# Patient Record
Sex: FEMALE | Race: WHITE | NOT HISPANIC OR LATINO
[De-identification: names, ages, dates, MRNs, and addresses within clinical notes are randomized per-mention and may not be internally consistent; named-entity substitution may affect disease eponyms.]

---

## 2018-05-01 ENCOUNTER — RESULT REVIEW (OUTPATIENT)
Age: 33
End: 2018-05-01

## 2018-08-06 ENCOUNTER — EMERGENCY (EMERGENCY)
Facility: HOSPITAL | Age: 33
LOS: 1 days | Discharge: HOME | End: 2018-08-06
Attending: EMERGENCY MEDICINE | Admitting: EMERGENCY MEDICINE
Payer: COMMERCIAL

## 2018-08-06 VITALS
DIASTOLIC BLOOD PRESSURE: 67 MMHG | HEART RATE: 94 BPM | RESPIRATION RATE: 17 BRPM | SYSTOLIC BLOOD PRESSURE: 117 MMHG | OXYGEN SATURATION: 100 % | TEMPERATURE: 99 F

## 2018-08-06 VITALS
OXYGEN SATURATION: 100 % | SYSTOLIC BLOOD PRESSURE: 128 MMHG | TEMPERATURE: 98 F | RESPIRATION RATE: 18 BRPM | DIASTOLIC BLOOD PRESSURE: 75 MMHG | HEIGHT: 69 IN | WEIGHT: 126.99 LBS | HEART RATE: 135 BPM

## 2018-08-06 DIAGNOSIS — R07.1 CHEST PAIN ON BREATHING: ICD-10-CM

## 2018-08-06 LAB
ALBUMIN SERPL ELPH-MCNC: 4.5 G/DL — SIGNIFICANT CHANGE UP (ref 3.3–5)
ALP SERPL-CCNC: 63 U/L — SIGNIFICANT CHANGE UP (ref 30–120)
ALT FLD-CCNC: 32 U/L DA — SIGNIFICANT CHANGE UP (ref 10–60)
ANION GAP SERPL CALC-SCNC: 15 MMOL/L — SIGNIFICANT CHANGE UP (ref 5–17)
APPEARANCE UR: CLEAR — SIGNIFICANT CHANGE UP
APTT BLD: 32.3 SEC — SIGNIFICANT CHANGE UP (ref 27.5–37.4)
AST SERPL-CCNC: 18 U/L — SIGNIFICANT CHANGE UP (ref 10–40)
BASOPHILS # BLD AUTO: 0.03 K/UL — SIGNIFICANT CHANGE UP (ref 0–0.2)
BASOPHILS NFR BLD AUTO: 0.6 % — SIGNIFICANT CHANGE UP (ref 0–2)
BILIRUB SERPL-MCNC: 0.3 MG/DL — SIGNIFICANT CHANGE UP (ref 0.2–1.2)
BILIRUB UR-MCNC: NEGATIVE — SIGNIFICANT CHANGE UP
BUN SERPL-MCNC: 14 MG/DL — SIGNIFICANT CHANGE UP (ref 7–23)
CALCIUM SERPL-MCNC: 9.9 MG/DL — SIGNIFICANT CHANGE UP (ref 8.4–10.5)
CHLORIDE SERPL-SCNC: 102 MMOL/L — SIGNIFICANT CHANGE UP (ref 96–108)
CK MB CFR SERPL CALC: 0.8 NG/ML — SIGNIFICANT CHANGE UP (ref 0–3.6)
CK SERPL-CCNC: 61 U/L — SIGNIFICANT CHANGE UP (ref 26–192)
CO2 SERPL-SCNC: 24 MMOL/L — SIGNIFICANT CHANGE UP (ref 22–31)
COLOR SPEC: YELLOW — SIGNIFICANT CHANGE UP
CREAT SERPL-MCNC: 0.8 MG/DL — SIGNIFICANT CHANGE UP (ref 0.5–1.3)
D DIMER BLD IA.RAPID-MCNC: 185 NG/ML DDU — SIGNIFICANT CHANGE UP
DIFF PNL FLD: NEGATIVE — SIGNIFICANT CHANGE UP
EOSINOPHIL # BLD AUTO: 0.16 K/UL — SIGNIFICANT CHANGE UP (ref 0–0.5)
EOSINOPHIL NFR BLD AUTO: 3 % — SIGNIFICANT CHANGE UP (ref 0–6)
GLUCOSE SERPL-MCNC: 116 MG/DL — HIGH (ref 70–99)
GLUCOSE UR QL: NEGATIVE MG/DL — SIGNIFICANT CHANGE UP
HCT VFR BLD CALC: 41 % — SIGNIFICANT CHANGE UP (ref 34.5–45)
HGB BLD-MCNC: 13.9 G/DL — SIGNIFICANT CHANGE UP (ref 11.5–15.5)
IMM GRANULOCYTES NFR BLD AUTO: 0.2 % — SIGNIFICANT CHANGE UP (ref 0–1.5)
INR BLD: 0.98 RATIO — SIGNIFICANT CHANGE UP (ref 0.88–1.16)
KETONES UR-MCNC: NEGATIVE — SIGNIFICANT CHANGE UP
LEUKOCYTE ESTERASE UR-ACNC: NEGATIVE — SIGNIFICANT CHANGE UP
LYMPHOCYTES # BLD AUTO: 1.95 K/UL — SIGNIFICANT CHANGE UP (ref 1–3.3)
LYMPHOCYTES # BLD AUTO: 36.9 % — SIGNIFICANT CHANGE UP (ref 13–44)
MCHC RBC-ENTMCNC: 31.3 PG — SIGNIFICANT CHANGE UP (ref 27–34)
MCHC RBC-ENTMCNC: 33.9 GM/DL — SIGNIFICANT CHANGE UP (ref 32–36)
MCV RBC AUTO: 92.3 FL — SIGNIFICANT CHANGE UP (ref 80–100)
MONOCYTES # BLD AUTO: 0.44 K/UL — SIGNIFICANT CHANGE UP (ref 0–0.9)
MONOCYTES NFR BLD AUTO: 8.3 % — SIGNIFICANT CHANGE UP (ref 2–14)
NEUTROPHILS # BLD AUTO: 2.69 K/UL — SIGNIFICANT CHANGE UP (ref 1.8–7.4)
NEUTROPHILS NFR BLD AUTO: 51 % — SIGNIFICANT CHANGE UP (ref 43–77)
NITRITE UR-MCNC: NEGATIVE — SIGNIFICANT CHANGE UP
PCP SPEC-MCNC: SIGNIFICANT CHANGE UP
PH UR: 5 — SIGNIFICANT CHANGE UP (ref 5–8)
PLATELET # BLD AUTO: 218 K/UL — SIGNIFICANT CHANGE UP (ref 150–400)
POTASSIUM SERPL-MCNC: 3.6 MMOL/L — SIGNIFICANT CHANGE UP (ref 3.5–5.3)
POTASSIUM SERPL-SCNC: 3.6 MMOL/L — SIGNIFICANT CHANGE UP (ref 3.5–5.3)
PROT SERPL-MCNC: 8.3 G/DL — SIGNIFICANT CHANGE UP (ref 6–8.3)
PROT UR-MCNC: NEGATIVE MG/DL — SIGNIFICANT CHANGE UP
PROTHROM AB SERPL-ACNC: 10.7 SEC — SIGNIFICANT CHANGE UP (ref 9.8–12.7)
RBC # BLD: 4.44 M/UL — SIGNIFICANT CHANGE UP (ref 3.8–5.2)
RBC # FLD: 13.1 % — SIGNIFICANT CHANGE UP (ref 10.3–14.5)
SODIUM SERPL-SCNC: 141 MMOL/L — SIGNIFICANT CHANGE UP (ref 135–145)
SP GR SPEC: 1.01 — SIGNIFICANT CHANGE UP (ref 1.01–1.02)
TROPONIN I SERPL-MCNC: 0 NG/ML — LOW (ref 0.02–0.06)
UROBILINOGEN FLD QL: NEGATIVE MG/DL — SIGNIFICANT CHANGE UP
WBC # BLD: 5.28 K/UL — SIGNIFICANT CHANGE UP (ref 3.8–10.5)
WBC # FLD AUTO: 5.28 K/UL — SIGNIFICANT CHANGE UP (ref 3.8–10.5)

## 2018-08-06 PROCEDURE — 93306 TTE W/DOPPLER COMPLETE: CPT

## 2018-08-06 PROCEDURE — 71260 CT THORAX DX C+: CPT | Mod: 26

## 2018-08-06 PROCEDURE — 85379 FIBRIN DEGRADATION QUANT: CPT

## 2018-08-06 PROCEDURE — 85027 COMPLETE CBC AUTOMATED: CPT

## 2018-08-06 PROCEDURE — 82550 ASSAY OF CK (CPK): CPT

## 2018-08-06 PROCEDURE — 71045 X-RAY EXAM CHEST 1 VIEW: CPT

## 2018-08-06 PROCEDURE — 85610 PROTHROMBIN TIME: CPT

## 2018-08-06 PROCEDURE — 85730 THROMBOPLASTIN TIME PARTIAL: CPT

## 2018-08-06 PROCEDURE — 80053 COMPREHEN METABOLIC PANEL: CPT

## 2018-08-06 PROCEDURE — 82553 CREATINE MB FRACTION: CPT

## 2018-08-06 PROCEDURE — 84484 ASSAY OF TROPONIN QUANT: CPT

## 2018-08-06 PROCEDURE — 81003 URINALYSIS AUTO W/O SCOPE: CPT

## 2018-08-06 PROCEDURE — 71260 CT THORAX DX C+: CPT

## 2018-08-06 PROCEDURE — 99284 EMERGENCY DEPT VISIT MOD MDM: CPT | Mod: 25

## 2018-08-06 PROCEDURE — 71045 X-RAY EXAM CHEST 1 VIEW: CPT | Mod: 26

## 2018-08-06 PROCEDURE — 93010 ELECTROCARDIOGRAM REPORT: CPT

## 2018-08-06 PROCEDURE — 93306 TTE W/DOPPLER COMPLETE: CPT | Mod: 26

## 2018-08-06 PROCEDURE — 99285 EMERGENCY DEPT VISIT HI MDM: CPT

## 2018-08-06 PROCEDURE — 93005 ELECTROCARDIOGRAM TRACING: CPT

## 2018-08-06 PROCEDURE — 80307 DRUG TEST PRSMV CHEM ANLYZR: CPT

## 2018-08-06 PROCEDURE — 99223 1ST HOSP IP/OBS HIGH 75: CPT | Mod: 25

## 2018-08-06 RX ORDER — HYDROMORPHONE HYDROCHLORIDE 2 MG/ML
1 INJECTION INTRAMUSCULAR; INTRAVENOUS; SUBCUTANEOUS ONCE
Qty: 0 | Refills: 0 | Status: DISCONTINUED | OUTPATIENT
Start: 2018-08-06 | End: 2018-08-06

## 2018-08-06 RX ORDER — ONDANSETRON 8 MG/1
4 TABLET, FILM COATED ORAL ONCE
Qty: 0 | Refills: 0 | Status: COMPLETED | OUTPATIENT
Start: 2018-08-06 | End: 2018-08-06

## 2018-08-06 RX ORDER — KETOROLAC TROMETHAMINE 30 MG/ML
30 SYRINGE (ML) INJECTION ONCE
Qty: 0 | Refills: 0 | Status: DISCONTINUED | OUTPATIENT
Start: 2018-08-06 | End: 2018-08-06

## 2018-08-06 RX ORDER — MORPHINE SULFATE 50 MG/1
2 CAPSULE, EXTENDED RELEASE ORAL ONCE
Qty: 0 | Refills: 0 | Status: DISCONTINUED | OUTPATIENT
Start: 2018-08-06 | End: 2018-08-06

## 2018-08-06 RX ORDER — SODIUM CHLORIDE 9 MG/ML
1000 INJECTION INTRAMUSCULAR; INTRAVENOUS; SUBCUTANEOUS ONCE
Qty: 0 | Refills: 0 | Status: COMPLETED | OUTPATIENT
Start: 2018-08-06 | End: 2018-08-06

## 2018-08-06 RX ORDER — ACETAMINOPHEN 500 MG
1000 TABLET ORAL ONCE
Qty: 0 | Refills: 0 | Status: COMPLETED | OUTPATIENT
Start: 2018-08-06 | End: 2018-08-06

## 2018-08-06 RX ADMIN — SODIUM CHLORIDE 1000 MILLILITER(S): 9 INJECTION INTRAMUSCULAR; INTRAVENOUS; SUBCUTANEOUS at 16:17

## 2018-08-06 RX ADMIN — SODIUM CHLORIDE 1000 MILLILITER(S): 9 INJECTION INTRAMUSCULAR; INTRAVENOUS; SUBCUTANEOUS at 17:17

## 2018-08-06 RX ADMIN — MORPHINE SULFATE 2 MILLIGRAM(S): 50 CAPSULE, EXTENDED RELEASE ORAL at 16:17

## 2018-08-06 RX ADMIN — MORPHINE SULFATE 2 MILLIGRAM(S): 50 CAPSULE, EXTENDED RELEASE ORAL at 16:47

## 2018-08-06 RX ADMIN — SODIUM CHLORIDE 1000 MILLILITER(S): 9 INJECTION INTRAMUSCULAR; INTRAVENOUS; SUBCUTANEOUS at 19:31

## 2018-08-06 RX ADMIN — Medication 1000 MILLIGRAM(S): at 17:32

## 2018-08-06 RX ADMIN — ONDANSETRON 4 MILLIGRAM(S): 8 TABLET, FILM COATED ORAL at 17:02

## 2018-08-06 RX ADMIN — HYDROMORPHONE HYDROCHLORIDE 1 MILLIGRAM(S): 2 INJECTION INTRAMUSCULAR; INTRAVENOUS; SUBCUTANEOUS at 16:31

## 2018-08-06 RX ADMIN — Medication 1000 MILLIGRAM(S): at 17:17

## 2018-08-06 RX ADMIN — Medication 400 MILLIGRAM(S): at 17:02

## 2018-08-06 RX ADMIN — Medication 30 MILLIGRAM(S): at 18:46

## 2018-08-06 RX ADMIN — SODIUM CHLORIDE 1000 MILLILITER(S): 9 INJECTION INTRAMUSCULAR; INTRAVENOUS; SUBCUTANEOUS at 20:30

## 2018-08-06 RX ADMIN — ONDANSETRON 4 MILLIGRAM(S): 8 TABLET, FILM COATED ORAL at 19:19

## 2018-08-06 RX ADMIN — Medication 30 MILLIGRAM(S): at 19:16

## 2018-08-06 RX ADMIN — HYDROMORPHONE HYDROCHLORIDE 1 MILLIGRAM(S): 2 INJECTION INTRAMUSCULAR; INTRAVENOUS; SUBCUTANEOUS at 17:01

## 2018-08-06 NOTE — ED PROVIDER NOTE - CARE PLAN
Principal Discharge DX:	Chest pain Principal Discharge DX:	Chest pain  Secondary Diagnosis:	Pulmonary nodules/lesions, multiple

## 2018-08-06 NOTE — CONSULT NOTE ADULT - SUBJECTIVE AND OBJECTIVE BOX
Date/Time Patient Seen:  		  Referring MD:   Data Reviewed	       Patient is a 33y old  Female who presents with a chief complaint of     Subjective/HPI  vs and meds reviewed  CP and anxiety  weakness  paresthesia  ct chest reviewed  labs reviewed  mom at the BS  pt works as a realtor  lives in [place] and [place]    · Reason for Referral/Consultation:	chest pain      · Subjective and Objective:   PCP:    REQUESTING PHYSICIAN:    REASON FOR CONSULT:    CHIEF COMPLAINT:    HPI:32 yo female presents with sharp, severe left sided chest pain since this afternoon while driving to  from [facility]. Pt reports that inspiration worsens her symptoms. Pt has a history of migraines, and a syncopal episode in the past. Pt reports worsening of her pain with movement and possibly palpation of the left chest and shoulder.     ED Provider Note [Charted Location: [facility]] [Authored: 06-Aug-2018 17:04]- for Visit: [id number], Incomplete, Revised, Signed w/additional Signatures Pending, General    HISTORY OF PRESENT ILLNESS:    High Risk Travel:  International Travel? No(1)    · Chief Complaint: The patient is a 33y Female complaining of chest pain.  · HPI Objective Statement: 32 y/o F with hx of prothrombin gene mutation presents with c/o chest pain x few hours today. Pt states that she has constant, sharp, L side chest pain radiating to L-shoulder with associated tingling in L arm. Pt states that pain is worse with breathing deeply or movement. Pt states that she has associated shortness of breath and palpitations. Denies fever, chills, cough, recent trauma/fall, numbness, vision changes, recent travel/immobilization/surgery/ocp use/smoking/drug use/hx of pe/dvt/ca, leg swelling/pain  · Presenting Symptoms: CHEST PAIN, DIZZINESS, PALPITATIONS, SHORTNESS OF BREATH  · Negative Findings: no back pain, no chills, no cough, no diaphoresis, no fever, no nausea, no syncope, no vomiting  · Location: pectoral region  · Laterality: left  · Area: lateral  · Radiation: shoulder  L  · Timing: sudden onset       PAST MEDICAL & SURGICAL HISTORY:  Prothrombin gene mutation        Medication list         MEDICATIONS  (STANDING):    MEDICATIONS  (PRN):         Vitals log        ICU Vital Signs Last 24 Hrs  T(C): 36.8 (06 Aug 2018 15:32), Max: 36.8 (06 Aug 2018 15:32)  T(F): 98.3 (06 Aug 2018 15:32), Max: 98.3 (06 Aug 2018 15:32)  HR: 78 (06 Aug 2018 18:00) (78 - 135)  BP: 107/77 (06 Aug 2018 18:00) (107/77 - 128/75)  BP(mean): --  ABP: --  ABP(mean): --  RR: 18 (06 Aug 2018 18:00) (18 - 18)  SpO2: 100% (06 Aug 2018 18:00) (100% - 100%)           Input and Output:  I&O's Detail      Lab Data                        13.9   5.28  )-----------( 218      ( 06 Aug 2018 16:09 )             41.0     08-06    141  |  102  |  14  ----------------------------<  116<H>  3.6   |  24  |  0.80    Ca    9.9      06 Aug 2018 16:09    TPro  8.3  /  Alb  4.5  /  TBili  0.3  /  DBili  x   /  AST  18  /  ALT  32  /  AlkPhos  63  08-06      CARDIAC MARKERS ( 06 Aug 2018 16:09 )  .000 ng/mL / x     / 61 U/L / x     / 0.8 ng/mL        Review of Systems	      Objective     Physical Examination    heart s1s2  lung dec BS  abd soft  cn grossly int  moves all extr  alert and oriented      Pertinent Lab findings & Imaging      Adriana:  NO   Adequate UO     I&O's Detail           Discussed with:     Cultures:	        Radiology

## 2018-08-06 NOTE — CONSULT NOTE ADULT - PROBLEM SELECTOR RECOMMENDATION 9
Echo reveals normal LV function without significant valvulopathy. ECG is not acute, troponin is negative. Doubt cardiac etiology. Continue NSAIDs. CT angio is negative for PE

## 2018-08-06 NOTE — ED ADULT NURSE NOTE - OBJECTIVE STATEMENT
patient has c/o left side chest pain x a few hours, never had this complaints before, denies long hours of travel, didn't take any medication, noted tachycardia on the cm, pending MD's eval, will continue to monitor.

## 2018-08-06 NOTE — ED ADULT NURSE NOTE - ADDITIONAL PRINTED INSTRUCTIONS GIVEN
Patient reviewed discharge instruction and medication with RN, follow up information given, Patient understood and ambulated to dc. iv removed by rn.

## 2018-08-06 NOTE — ED PROVIDER NOTE - PROGRESS NOTE DETAILS
Pt examined by ED attending, Dr. Wray who agreed with disposition and plan. Call put out to Dr. Benitez, cardiologist for consult. Cardiologist present in ED. Pt was evaluated by Dr. Benitez, Discussed case/results with Dr. Smith. Pt was evaluated by Dr. Benitez, advised ekg/CE/ECHO wnl, advised to give toradol and valium IV, likely musculoskeletal Pt seen by Dr. Galloway, cleared for discharge, f/u pmd for further testing/tx. Pt seen by Dr. Smiht, cleared for discharge, f/u pmd for further testing/tx. - r/o autoimmune or connective tissue d/o, repeat ct scan in 6 months.

## 2018-08-06 NOTE — ED ADULT NURSE NOTE - NSIMPLEMENTINTERV_GEN_ALL_ED
Implemented All Universal Safety Interventions:  [place] to call system. Call bell, personal items and telephone within reach. Instruct patient to call for assistance. Room bathroom lighting operational. Non-slip footwear when patient is off stretcher. Physically safe environment: no spills, clutter or unnecessary equipment. Stretcher in lowest position, wheels locked, appropriate side rails in place.

## 2018-08-06 NOTE — CONSULT NOTE ADULT - PROBLEM SELECTOR RECOMMENDATION 9
non specific symptoms  CP and anxiety and weakness,   no evidence of organic disease on this exam and or on imaging / labs  follow up with PMD  consider CT chest in 6 months to eval nodules size and number  consider CTD and autoimmune work up as outpatient with PMD  consider PUD and GERD work up as outpatient  cardio eval noted  discussed tylenol and nsaid use at home for pain and aches  reassurance provided  planned for dc home with follow up at PMD office  discussed with mom and ER provider

## 2018-08-06 NOTE — CONSULT NOTE ADULT - SUBJECTIVE AND OBJECTIVE BOX
PCP:    REQUESTING PHYSICIAN:    REASON FOR CONSULT:    CHIEF COMPLAINT:    HPI:34 yo female presents with sharp, severe left sided chest pain since this afternoon while driving to  from [facility]. Pt reports that inspiration worsens her symptoms. Pt has a history of migraines, and a syncopal episode in the past. Pt reports worsening of her pain with movement and possibly palpation of the left chest and shoulder.       PAST MEDICAL & SURGICAL HISTORY:  Prothrombin gene mutation      Allergies    No Known Allergies    Intolerances        SOCIAL HISTORY:    FAMILY HISTORY:      MEDICATIONS:  MEDICATIONS  (STANDING):    MEDICATIONS  (PRN):      REVIEW OF SYSTEMS:    CONSTITUTIONAL: No weakness, fevers or chills  EYES/ENT: No visual changes;  No vertigo or throat pain   NECK: No pain or stiffness  RESPIRATORY: No cough, wheezing, hemoptysis; No shortness of breath  CARDIOVASCULAR: Chest pain  GASTROINTESTINAL: No abdominal or epigastric pain. No nausea, vomiting, or hematemesis; No diarrhea or constipation. No melena or hematochezia.  GENITOURINARY: No dysuria, frequency or hematuria  NEUROLOGICAL: No numbness or weakness  SKIN: No itching, burning, rashes, or lesions   All other review of systems is negative unless indicated above    Vital Signs Last 24 Hrs  T(C): 36.8 (06 Aug 2018 15:32), Max: 36.8 (06 Aug 2018 15:32)  T(F): 98.3 (06 Aug 2018 15:32), Max: 98.3 (06 Aug 2018 15:32)  HR: 78 (06 Aug 2018 18:00) (78 - 135)  BP: 107/77 (06 Aug 2018 18:00) (107/77 - 128/75)  BP(mean): --  RR: 18 (06 Aug 2018 18:00) (18 - 18)  SpO2: 100% (06 Aug 2018 18:00) (100% - 100%)    I&O's Summary      PHYSICAL EXAM:    Constitutional: NAD, awake and alert, well-developed, in pain  HEENT: PERR, EOMI,  No oral cyananosis.  Neck:  supple,  No JVD  Respiratory: Breath sounds are clear bilaterally, No wheezing, rales or rhonchi  Cardiovascular: S1 and S2, regular rate and rhythm, no Murmurs, gallops or rubs  Gastrointestinal: Bowel Sounds present, soft, nontender.   Extremities: No peripheral edema. No clubbing or cyanosis.  Vascular: 2+ peripheral pulses  Neurological: A/O x 3, no focal deficits  Musculoskeletal: no calf tenderness.  Skin: No rashes.      LABS: All Labs Reviewed:                        13.9   5.28  )-----------( 218      ( 06 Aug 2018 16:09 )             41.0     06 Aug 2018 16:09    141    |  102    |  14     ----------------------------<  116    3.6     |  24     |  0.80     Ca    9.9        06 Aug 2018 16:09    TPro  8.3    /  Alb  4.5    /  TBili  0.3    /  DBili  x      /  AST  18     /  ALT  32     /  AlkPhos  63     06 Aug 2018 16:09    PT/INR - ( 06 Aug 2018 16:09 )   PT: 10.7 sec;   INR: 0.98 ratio         PTT - ( 06 Aug 2018 16:09 )  PTT:32.3 sec  CARDIAC MARKERS ( 06 Aug 2018 16:09 )  .000 ng/mL / x     / 61 U/L / x     / 0.8 ng/mL      Blood Culture:         RADIOLOGY/EKG:

## 2018-08-06 NOTE — ED PROVIDER NOTE - OBJECTIVE STATEMENT
34 y/o F with hx of prothrombin gene mutation presents with c/o chest pain x few hours today. Pt states that she has constant, sharp, L side chest pain radiating to L-shoulder with associated tingling in L arm. Pt states that pain is worse with breathing deeply or movement. Pt states that she has associated shortness of breath and palpitations. Denies fever, chills, cough, recent trauma/fall, numbness, vision changes, recent travel/immobilization/surgery/ocp use/smoking/drug use/hx of pe/dvt/ca, leg swelling/pain

## 2018-08-06 NOTE — ED PROVIDER NOTE - ATTENDING CONTRIBUTION TO CARE
I, Dr Wray, have personally performed a face to face diagnostic evaluation on this patient with the PA/NP. I have reviewed the PA/NP's note and agree with the history, Physical exam and plan of care, As per history 34 y/o F with hx of prothrombin gene mutation presents with c/o chest pain x few hours today. Pt states that she has constant, sharp, L side chest pain radiating to L-shoulder with associated tingling in L arm. Pt states that pain is worse with breathing deeply or movement. Pt states that she has associated shortness of breath and palpitations. Denies fever, chills, cough, recent trauma/fall, numbness, PE patient seem to have a significant pain left side of chest review of imaging ang lab test been unremarkable patient seen bu Cardio and pulmonary specialist advice fallow up with pmd and repeat ct in 6 mons.

## 2018-08-06 NOTE — ED ADULT NURSE REASSESSMENT NOTE - NS ED NURSE REASSESS COMMENT FT1
patient was nauseous and PA at bedside, HR came down with pain medications, Pt is in no acute distress. mother at bedside, will continue to monitor.
Patient reviewed discharge instruction and medication with RN, follow up information given, Patient understood and ambulated to dc. iv removed by rn.

## 2018-08-06 NOTE — ED PROVIDER NOTE - MEDICAL DECISION MAKING DETAILS
34 y/o f with hx of prothrombin gene mutation here with c/o L-side chest pain and tingling sensation in L arm x few hours pta with associated shortness of breath and palpitations; no leg swelling/pain/ocp/smoking/hx of pe; will get ekg, pain meds, ivf, labs, CE, d-dimer, cardiology consult, ct chest, re-assess

## 2018-08-06 NOTE — ED PROVIDER NOTE - CONSTITUTIONAL, MLM
normal... Well appearing, well nourished, awake, alert, oriented to person, place, time/situation and in distress from pain

## 2018-08-06 NOTE — ED PROVIDER NOTE - CONDUCTED A DETAILED DISCUSSION WITH PATIENT AND/OR GUARDIAN REGARDING, MDM
radiology results/lab results lab results/return to ED if symptoms worsen, persist or questions arise/radiology results/need for outpatient follow-up

## 2020-03-16 ENCOUNTER — EMERGENCY (EMERGENCY)
Facility: HOSPITAL | Age: 35
LOS: 1 days | Discharge: HOME | End: 2020-03-16
Attending: EMERGENCY MEDICINE | Admitting: EMERGENCY MEDICINE
Payer: COMMERCIAL

## 2020-03-16 VITALS
TEMPERATURE: 98 F | HEART RATE: 85 BPM | SYSTOLIC BLOOD PRESSURE: 118 MMHG | OXYGEN SATURATION: 98 % | WEIGHT: 164.91 LBS | DIASTOLIC BLOOD PRESSURE: 87 MMHG | RESPIRATION RATE: 22 BRPM | HEIGHT: 71 IN

## 2020-03-16 PROCEDURE — 99282 EMERGENCY DEPT VISIT SF MDM: CPT

## 2020-03-16 PROCEDURE — U0001: CPT

## 2020-03-16 PROCEDURE — 99283 EMERGENCY DEPT VISIT LOW MDM: CPT | Mod: 25

## 2020-03-16 NOTE — ED ADULT NURSE NOTE - OBJECTIVE STATEMENT
36yo female walked into ED, pt c/o "I just want to be check for coronavirus" as per pt. pt denies any signs/sysmptoms. pt indicates her boyfriend has tested positive for coronavirus. pt denies any recent travel, fever, cough.

## 2020-03-16 NOTE — ED ADULT NURSE REASSESSMENT NOTE - NS ED NURSE REASSESS COMMENT FT1
Pt understands and accepts COVID-19 teaching. pt understands she will be contacted in reference to results. pt refused d/c vitals

## 2020-03-16 NOTE — ED PROVIDER NOTE - PATIENT PORTAL LINK FT
You can access the FollowMyHealth Patient Portal offered by [facility] by registering at the following website: [email or web address]. By joining [facility]’s FollowMyHealth portal, you will also be able to view your health information using other applications (apps) compatible with our system.

## 2020-03-16 NOTE — ED ADULT TRIAGE NOTE - CHIEF COMPLAINT QUOTE
im having an asthma attack my boyfriend tested positive for the coronavirus and I want top be tested. I don't have symptoms.

## 2020-03-16 NOTE — ED ADULT NURSE NOTE - CHPI ED NUR SYMPTOMS NEG
no vomiting/no decreased eating/drinking/no dizziness/no tingling/no fever/no nausea/no weakness/no pain/no chills

## 2020-03-16 NOTE — ED PROVIDER NOTE - OBJECTIVE STATEMENT
35 year old female presents to the ED asymptomatic. States her close friend with whom she is in close contacted has tested positive for corona virus,. Pt here not requesting to be tested. Pt is here now asymptomatic, no respiratory symptoms, no fevers. 35 year old female presents to the ED asymptomatic. States her close friend with whom she is in close contacted has tested positive for coronavirus,. Pt here not requesting to be tested. Pt is here now asymptomatic, no respiratory symptoms, no fevers.

## 2020-03-16 NOTE — ED PROVIDER NOTE - NSFOLLOWUPINSTRUCTIONS_ED_ALL_ED_FT
Please return to the emergency department immediately for any urgent/concerning symptoms such as shortness of breath.  You should receive a phone call if your test comes back positive.   It is recommended you isolate as a precaution.

## 2020-03-18 LAB — SARS-COV-2 RNA SPEC QL NAA+PROBE: SIGNIFICANT CHANGE UP

## 2020-08-31 ENCOUNTER — EMERGENCY (EMERGENCY)
Facility: HOSPITAL | Age: 35
LOS: 1 days | Discharge: HOME | End: 2020-08-31
Attending: EMERGENCY MEDICINE | Admitting: EMERGENCY MEDICINE
Payer: COMMERCIAL

## 2020-08-31 VITALS
DIASTOLIC BLOOD PRESSURE: 64 MMHG | RESPIRATION RATE: 17 BRPM | HEART RATE: 83 BPM | TEMPERATURE: 98 F | WEIGHT: 123.02 LBS | SYSTOLIC BLOOD PRESSURE: 120 MMHG | OXYGEN SATURATION: 100 % | HEIGHT: 69 IN

## 2020-08-31 VITALS
OXYGEN SATURATION: 99 % | TEMPERATURE: 98 F | DIASTOLIC BLOOD PRESSURE: 65 MMHG | RESPIRATION RATE: 15 BRPM | SYSTOLIC BLOOD PRESSURE: 108 MMHG | HEART RATE: 72 BPM

## 2020-08-31 DIAGNOSIS — Z90.49 ACQUIRED ABSENCE OF OTHER SPECIFIED PARTS OF DIGESTIVE TRACT: Chronic | ICD-10-CM

## 2020-08-31 LAB
ALBUMIN SERPL ELPH-MCNC: 3.9 G/DL — SIGNIFICANT CHANGE UP (ref 3.3–5)
ALP SERPL-CCNC: 40 U/L — SIGNIFICANT CHANGE UP (ref 30–120)
ALT FLD-CCNC: 22 U/L DA — SIGNIFICANT CHANGE UP (ref 10–60)
ANION GAP SERPL CALC-SCNC: 7 MMOL/L — SIGNIFICANT CHANGE UP (ref 5–17)
APPEARANCE UR: ABNORMAL
AST SERPL-CCNC: 20 U/L — SIGNIFICANT CHANGE UP (ref 10–40)
BACTERIA # UR AUTO: ABNORMAL
BASOPHILS # BLD AUTO: 0.03 K/UL — SIGNIFICANT CHANGE UP (ref 0–0.2)
BASOPHILS NFR BLD AUTO: 0.4 % — SIGNIFICANT CHANGE UP (ref 0–2)
BILIRUB SERPL-MCNC: 0.5 MG/DL — SIGNIFICANT CHANGE UP (ref 0.2–1.2)
BILIRUB UR-MCNC: NEGATIVE — SIGNIFICANT CHANGE UP
BUN SERPL-MCNC: 15 MG/DL — SIGNIFICANT CHANGE UP (ref 7–23)
CALCIUM SERPL-MCNC: 8.8 MG/DL — SIGNIFICANT CHANGE UP (ref 8.4–10.5)
CHLORIDE SERPL-SCNC: 104 MMOL/L — SIGNIFICANT CHANGE UP (ref 96–108)
CO2 SERPL-SCNC: 28 MMOL/L — SIGNIFICANT CHANGE UP (ref 22–31)
COLOR SPEC: YELLOW — SIGNIFICANT CHANGE UP
COMMENT - URINE: SIGNIFICANT CHANGE UP
CREAT SERPL-MCNC: 0.93 MG/DL — SIGNIFICANT CHANGE UP (ref 0.5–1.3)
DIFF PNL FLD: NEGATIVE — SIGNIFICANT CHANGE UP
EOSINOPHIL # BLD AUTO: 0.7 K/UL — HIGH (ref 0–0.5)
EOSINOPHIL NFR BLD AUTO: 9.3 % — HIGH (ref 0–6)
EPI CELLS # UR: SIGNIFICANT CHANGE UP
GLUCOSE SERPL-MCNC: 114 MG/DL — HIGH (ref 70–99)
GLUCOSE UR QL: NEGATIVE MG/DL — SIGNIFICANT CHANGE UP
HCG UR QL: NEGATIVE — SIGNIFICANT CHANGE UP
HCT VFR BLD CALC: 37.5 % — SIGNIFICANT CHANGE UP (ref 34.5–45)
HGB BLD-MCNC: 12.7 G/DL — SIGNIFICANT CHANGE UP (ref 11.5–15.5)
IMM GRANULOCYTES NFR BLD AUTO: 0.3 % — SIGNIFICANT CHANGE UP (ref 0–1.5)
KETONES UR-MCNC: ABNORMAL
LEUKOCYTE ESTERASE UR-ACNC: NEGATIVE — SIGNIFICANT CHANGE UP
LIDOCAIN IGE QN: 172 U/L — SIGNIFICANT CHANGE UP (ref 73–393)
LYMPHOCYTES # BLD AUTO: 1.07 K/UL — SIGNIFICANT CHANGE UP (ref 1–3.3)
LYMPHOCYTES # BLD AUTO: 14.3 % — SIGNIFICANT CHANGE UP (ref 13–44)
MCHC RBC-ENTMCNC: 33.9 GM/DL — SIGNIFICANT CHANGE UP (ref 32–36)
MCHC RBC-ENTMCNC: 34 PG — SIGNIFICANT CHANGE UP (ref 27–34)
MCV RBC AUTO: 100.3 FL — HIGH (ref 80–100)
MONOCYTES # BLD AUTO: 0.38 K/UL — SIGNIFICANT CHANGE UP (ref 0–0.9)
MONOCYTES NFR BLD AUTO: 5.1 % — SIGNIFICANT CHANGE UP (ref 2–14)
NEUTROPHILS # BLD AUTO: 5.3 K/UL — SIGNIFICANT CHANGE UP (ref 1.8–7.4)
NEUTROPHILS NFR BLD AUTO: 70.6 % — SIGNIFICANT CHANGE UP (ref 43–77)
NITRITE UR-MCNC: NEGATIVE — SIGNIFICANT CHANGE UP
NRBC # BLD: 0 /100 WBCS — SIGNIFICANT CHANGE UP (ref 0–0)
PH UR: 6 — SIGNIFICANT CHANGE UP (ref 5–8)
PLATELET # BLD AUTO: 180 K/UL — SIGNIFICANT CHANGE UP (ref 150–400)
POTASSIUM SERPL-MCNC: 3.6 MMOL/L — SIGNIFICANT CHANGE UP (ref 3.5–5.3)
POTASSIUM SERPL-SCNC: 3.6 MMOL/L — SIGNIFICANT CHANGE UP (ref 3.5–5.3)
PROT SERPL-MCNC: 6.9 G/DL — SIGNIFICANT CHANGE UP (ref 6–8.3)
PROT UR-MCNC: 15 MG/DL
RBC # BLD: 3.74 M/UL — LOW (ref 3.8–5.2)
RBC # FLD: 13.6 % — SIGNIFICANT CHANGE UP (ref 10.3–14.5)
SODIUM SERPL-SCNC: 139 MMOL/L — SIGNIFICANT CHANGE UP (ref 135–145)
SP GR SPEC: 1.02 — SIGNIFICANT CHANGE UP (ref 1.01–1.02)
UROBILINOGEN FLD QL: NEGATIVE MG/DL — SIGNIFICANT CHANGE UP
WBC # BLD: 7.5 K/UL — SIGNIFICANT CHANGE UP (ref 3.8–10.5)
WBC # FLD AUTO: 7.5 K/UL — SIGNIFICANT CHANGE UP (ref 3.8–10.5)
WBC UR QL: SIGNIFICANT CHANGE UP

## 2020-08-31 PROCEDURE — 74177 CT ABD & PELVIS W/CONTRAST: CPT

## 2020-08-31 PROCEDURE — 76830 TRANSVAGINAL US NON-OB: CPT

## 2020-08-31 PROCEDURE — 76830 TRANSVAGINAL US NON-OB: CPT | Mod: 26

## 2020-08-31 PROCEDURE — 96375 TX/PRO/DX INJ NEW DRUG ADDON: CPT

## 2020-08-31 PROCEDURE — 36415 COLL VENOUS BLD VENIPUNCTURE: CPT

## 2020-08-31 PROCEDURE — 74177 CT ABD & PELVIS W/CONTRAST: CPT | Mod: 26

## 2020-08-31 PROCEDURE — 81025 URINE PREGNANCY TEST: CPT

## 2020-08-31 PROCEDURE — 99284 EMERGENCY DEPT VISIT MOD MDM: CPT | Mod: 25

## 2020-08-31 PROCEDURE — 96361 HYDRATE IV INFUSION ADD-ON: CPT

## 2020-08-31 PROCEDURE — 83690 ASSAY OF LIPASE: CPT

## 2020-08-31 PROCEDURE — 81001 URINALYSIS AUTO W/SCOPE: CPT

## 2020-08-31 PROCEDURE — 80053 COMPREHEN METABOLIC PANEL: CPT

## 2020-08-31 PROCEDURE — 96374 THER/PROPH/DIAG INJ IV PUSH: CPT | Mod: XU

## 2020-08-31 PROCEDURE — 99285 EMERGENCY DEPT VISIT HI MDM: CPT

## 2020-08-31 PROCEDURE — 85027 COMPLETE CBC AUTOMATED: CPT

## 2020-08-31 RX ORDER — ONDANSETRON 8 MG/1
4 TABLET, FILM COATED ORAL ONCE
Refills: 0 | Status: COMPLETED | OUTPATIENT
Start: 2020-08-31 | End: 2020-08-31

## 2020-08-31 RX ORDER — ONDANSETRON 8 MG/1
1 TABLET, FILM COATED ORAL
Qty: 15 | Refills: 0
Start: 2020-08-31 | End: 2020-09-04

## 2020-08-31 RX ORDER — POLYETHYLENE GLYCOL 3350 17 G/17G
17 POWDER, FOR SOLUTION ORAL
Qty: 119 | Refills: 0
Start: 2020-08-31 | End: 2020-09-06

## 2020-08-31 RX ORDER — SODIUM CHLORIDE 9 MG/ML
1000 INJECTION INTRAMUSCULAR; INTRAVENOUS; SUBCUTANEOUS ONCE
Refills: 0 | Status: COMPLETED | OUTPATIENT
Start: 2020-08-31 | End: 2020-08-31

## 2020-08-31 RX ORDER — KETOROLAC TROMETHAMINE 30 MG/ML
30 SYRINGE (ML) INJECTION ONCE
Refills: 0 | Status: DISCONTINUED | OUTPATIENT
Start: 2020-08-31 | End: 2020-08-31

## 2020-08-31 RX ADMIN — SODIUM CHLORIDE 1000 MILLILITER(S): 9 INJECTION INTRAMUSCULAR; INTRAVENOUS; SUBCUTANEOUS at 16:15

## 2020-08-31 RX ADMIN — ONDANSETRON 4 MILLIGRAM(S): 8 TABLET, FILM COATED ORAL at 16:15

## 2020-08-31 RX ADMIN — Medication 30 MILLIGRAM(S): at 16:30

## 2020-08-31 RX ADMIN — Medication 30 MILLIGRAM(S): at 17:30

## 2020-08-31 RX ADMIN — SODIUM CHLORIDE 1000 MILLILITER(S): 9 INJECTION INTRAMUSCULAR; INTRAVENOUS; SUBCUTANEOUS at 17:30

## 2020-08-31 NOTE — ED PROVIDER NOTE - PATIENT PORTAL LINK FT
Detail Level: Detailed You can access the FollowMyHealth Patient Portal offered by [facility] by registering at the following website: [email or web address]. By joining [facility]’s FollowMyHealth portal, you will also be able to view your health information using other applications (apps) compatible with our system.

## 2020-08-31 NOTE — ED PROVIDER NOTE - ATTENDING CONTRIBUTION TO CARE
I have personally performed a face to face diagnostic evaluation on this patient.  I have reviewed the PA note and agree with the history, exam, and plan of care, except as noted.  History and Exam by me shows  lower abd pain intermittently x 1 week.  no gu or gi complaints.  no fever or chills.  no other complaints.  pt is in nad.  a n ox 3.  abd soft, nt, no guarding or rebound, no cvat bl.  labs were unremarkable, pending us and ct.

## 2020-08-31 NOTE — ED ADULT NURSE NOTE - OBJECTIVE STATEMENT
Patient to ED c/o approximately 1 week of abdominal pains with nausea. Patient states last normal BM was about 5-6 days ago and has been constipated since. She took Senekot a few days ago and today had a very small hard BM. Nausea has been persistent with decreased appetite, pain is lower abdomen with increased tenderness noted to RLQ and comes and goes but is severe today and radiates through to back.

## 2020-08-31 NOTE — ED ADULT NURSE NOTE - CHPI ED NUR SYMPTOMS NEG
no blood in stool/no burning urination/no chills/no dysuria/no hematuria/no vomiting/no abdominal distension/no fever

## 2020-08-31 NOTE — ED PROVIDER NOTE - NONTENDER LOCATION
periumbilical/left costovertebral angle/right upper quadrant/left upper quadrant/umbilical/right costovertebral angle/left lower quadrant

## 2020-08-31 NOTE — ED PROVIDER NOTE - CARE PROVIDER_API CALL
Michael Knox  OBSTETRICS AND GYNECOLOGY  [address] [place] [id number]  Phone: [phone]  Fax: [phone]  Follow Up Time:     Brad Urrutia (DO)  Internal Medicine  [address] [place] [id number]  Phone: [phone]  Fax: [phone]  Follow Up Time:

## 2020-08-31 NOTE — ED PROVIDER NOTE - CLINICAL SUMMARY MEDICAL DECISION MAKING FREE TEXT BOX
34 y/o female with PMHx of Prothrombin gene mutation presents to the ED c/o abd pain x1 week. Pt reports intermittent diffuse lower abd pain, described as sharp, rated pain 10/10. Pt reports associated nausea without vomiting, and constipation. Pt reports having small hard bowel movement this morning. Pt took Aleve and Senakot, which provided no relief. will do labs, ua to r/o uti, tvus, pain control, zofran and re-eval

## 2020-08-31 NOTE — ED PROVIDER NOTE - OBJECTIVE STATEMENT
34 y/o female with PMHx of Prothrombin gene mutation presents to the ED c/o abd pain x1 week. Pt reports intermittent diffuse lower abd pain, described as sharp, rated pain 10/10. Pt reports associated nausea without vomiting, and constipation. Pt reports having small hard bowel movement this morning. Pt took Aleve and Senakot, which provided no relief. Pt called PCP, but PCP unable to see her so she came to ED for further evaluation.    LMP - 8/6/2020. 34 y/o female with PMHx of Prothrombin gene mutation presents to the ED c/o abd pain x1 week. Pt reports intermittent diffuse lower abd pain, described as sharp, rated pain 10/10. Pt reports associated nausea without vomiting, and constipation. Pt reports having small hard bowel movement this morning. Pt took Aleve and Senakot, which provided no relief. Pt called PCP, but PCP unable to see her so she came to ED for further evaluation.  LMP - 8/6/2020.

## 2020-08-31 NOTE — ED PROVIDER NOTE - PROGRESS NOTE DETAILS
pt improved. labs and imaging reviewed. advised gi and GYN follow up. All imaging and labs reviewed. all results reviewed with pt including abnormal results. pt given a copy of results. pt advised to follow up with pmd regarding abnormal results. All questions answered and concerns addressed. pt verbalized understanding and agreement with plan and dx. pt advised on next step and when/where to follow up. pt advised on all take home and otc medications. pt advised to follow up with PMD. pt advised to return to ed for worsenng symptoms including fever, cp, sob. will dc. pt improved. labs and imaging reviewed. advised gi and GYN follow up for possible malignancy.  All imaging and labs reviewed. all results reviewed with pt including abnormal results. pt given a copy of results. pt advised to follow up with pmd regarding abnormal results. All questions answered and concerns addressed. pt verbalized understanding and agreement with plan and dx. pt advised on next step and when/where to follow up. pt advised on all take home and otc medications. pt advised to follow up with PMD. pt advised to return to ed for worsenng symptoms including fever, cp, sob. will dc.

## 2020-08-31 NOTE — ED PROVIDER NOTE - CHPI ED SYMPTOMS NEG
no diarrhea/no fever/no vomiting/no burning urination/no abdominal distension/no blood in stool/no dysuria

## 2020-10-07 PROBLEM — Z87.42 PERSONAL HISTORY OF OTHER DISEASES OF THE FEMALE GENITAL TRACT: Chronic | Status: ACTIVE | Noted: 2020-08-31

## 2020-10-14 ENCOUNTER — APPOINTMENT (OUTPATIENT)
Dept: SURGERY | Facility: CLINIC | Age: 35
End: 2020-10-14
Payer: COMMERCIAL

## 2020-10-14 VITALS
OXYGEN SATURATION: 99 % | DIASTOLIC BLOOD PRESSURE: 69 MMHG | WEIGHT: 123 LBS | HEART RATE: 75 BPM | SYSTOLIC BLOOD PRESSURE: 115 MMHG

## 2020-10-14 DIAGNOSIS — Z83.3 FAMILY HISTORY OF DIABETES MELLITUS: ICD-10-CM

## 2020-10-14 DIAGNOSIS — N84.0 POLYP OF CORPUS UTERI: ICD-10-CM

## 2020-10-14 DIAGNOSIS — N83.292 OTHER OVARIAN CYST, LEFT SIDE: ICD-10-CM

## 2020-10-14 DIAGNOSIS — Z78.9 OTHER SPECIFIED HEALTH STATUS: ICD-10-CM

## 2020-10-14 DIAGNOSIS — K29.61 OTHER GASTRITIS WITH BLEEDING: ICD-10-CM

## 2020-10-14 DIAGNOSIS — Z82.49 FAMILY HISTORY OF ISCHEMIC HEART DISEASE AND OTHER DISEASES OF THE CIRCULATORY SYSTEM: ICD-10-CM

## 2020-10-14 DIAGNOSIS — K44.9 DIAPHRAGMATIC HERNIA W/OUT OBSTRUCTION OR GANGRENE: ICD-10-CM

## 2020-10-14 DIAGNOSIS — Z98.890 OTHER SPECIFIED POSTPROCEDURAL STATES: ICD-10-CM

## 2020-10-14 DIAGNOSIS — K64.4 RESIDUAL HEMORRHOIDAL SKIN TAGS: ICD-10-CM

## 2020-10-14 PROCEDURE — 99205 OFFICE O/P NEW HI 60 MIN: CPT

## 2020-10-14 RX ORDER — MULTIVITAMIN
CAPSULE ORAL
Refills: 0 | Status: ACTIVE | COMMUNITY
Start: 2020-10-14

## 2020-10-14 RX ORDER — VALACYCLOVIR HYDROCHLORIDE 1 G/1
1 TABLET, FILM COATED ORAL
Refills: 0 | Status: ACTIVE | COMMUNITY
Start: 2020-10-14

## 2020-10-22 ENCOUNTER — NON-APPOINTMENT (OUTPATIENT)
Age: 35
End: 2020-10-22

## 2020-10-28 ENCOUNTER — OUTPATIENT (OUTPATIENT)
Dept: OUTPATIENT SERVICES | Facility: HOSPITAL | Age: 35
LOS: 1 days | End: 2020-10-28
Payer: COMMERCIAL

## 2020-10-28 VITALS
HEIGHT: 69 IN | TEMPERATURE: 99 F | OXYGEN SATURATION: 99 % | SYSTOLIC BLOOD PRESSURE: 109 MMHG | WEIGHT: 126.1 LBS | RESPIRATION RATE: 14 BRPM | DIASTOLIC BLOOD PRESSURE: 60 MMHG | HEART RATE: 76 BPM

## 2020-10-28 DIAGNOSIS — Z98.890 OTHER SPECIFIED POSTPROCEDURAL STATES: Chronic | ICD-10-CM

## 2020-10-28 DIAGNOSIS — N84.0 POLYP OF CORPUS UTERI: ICD-10-CM

## 2020-10-28 DIAGNOSIS — D37.2 NEOPLASM OF UNCERTAIN BEHAVIOR OF SMALL INTESTINE: ICD-10-CM

## 2020-10-28 DIAGNOSIS — Z01.818 ENCOUNTER FOR OTHER PREPROCEDURAL EXAMINATION: ICD-10-CM

## 2020-10-28 DIAGNOSIS — Z90.49 ACQUIRED ABSENCE OF OTHER SPECIFIED PARTS OF DIGESTIVE TRACT: Chronic | ICD-10-CM

## 2020-10-28 LAB
ANION GAP SERPL CALC-SCNC: 4 MMOL/L — LOW (ref 5–17)
APTT BLD: 32.9 SEC — SIGNIFICANT CHANGE UP (ref 27.5–35.5)
BUN SERPL-MCNC: 13 MG/DL — SIGNIFICANT CHANGE UP (ref 7–23)
CALCIUM SERPL-MCNC: 8.7 MG/DL — SIGNIFICANT CHANGE UP (ref 8.5–10.1)
CHLORIDE SERPL-SCNC: 108 MMOL/L — SIGNIFICANT CHANGE UP (ref 96–108)
CO2 SERPL-SCNC: 29 MMOL/L — SIGNIFICANT CHANGE UP (ref 22–31)
CREAT SERPL-MCNC: 0.7 MG/DL — SIGNIFICANT CHANGE UP (ref 0.5–1.3)
GLUCOSE SERPL-MCNC: 79 MG/DL — SIGNIFICANT CHANGE UP (ref 70–99)
HCG SERPL-ACNC: <1 MIU/ML — SIGNIFICANT CHANGE UP
HCT VFR BLD CALC: 39.2 % — SIGNIFICANT CHANGE UP (ref 34.5–45)
HGB BLD-MCNC: 13.1 G/DL — SIGNIFICANT CHANGE UP (ref 11.5–15.5)
INR BLD: 1.12 RATIO — SIGNIFICANT CHANGE UP (ref 0.88–1.16)
MCHC RBC-ENTMCNC: 33.4 GM/DL — SIGNIFICANT CHANGE UP (ref 32–36)
MCHC RBC-ENTMCNC: 33.4 PG — SIGNIFICANT CHANGE UP (ref 27–34)
MCV RBC AUTO: 100 FL — SIGNIFICANT CHANGE UP (ref 80–100)
NRBC # BLD: 0 /100 WBCS — SIGNIFICANT CHANGE UP (ref 0–0)
PLATELET # BLD AUTO: 196 K/UL — SIGNIFICANT CHANGE UP (ref 150–400)
POTASSIUM SERPL-MCNC: 4.1 MMOL/L — SIGNIFICANT CHANGE UP (ref 3.5–5.3)
POTASSIUM SERPL-SCNC: 4.1 MMOL/L — SIGNIFICANT CHANGE UP (ref 3.5–5.3)
PROTHROM AB SERPL-ACNC: 13 SEC — SIGNIFICANT CHANGE UP (ref 10.6–13.6)
RBC # BLD: 3.92 M/UL — SIGNIFICANT CHANGE UP (ref 3.8–5.2)
RBC # FLD: 13.1 % — SIGNIFICANT CHANGE UP (ref 10.3–14.5)
SARS-COV-2 RNA SPEC QL NAA+PROBE: SIGNIFICANT CHANGE UP
SODIUM SERPL-SCNC: 141 MMOL/L — SIGNIFICANT CHANGE UP (ref 135–145)
WBC # BLD: 4.95 K/UL — SIGNIFICANT CHANGE UP (ref 3.8–10.5)
WBC # FLD AUTO: 4.95 K/UL — SIGNIFICANT CHANGE UP (ref 3.8–10.5)

## 2020-10-28 PROCEDURE — 87641 MR-STAPH DNA AMP PROBE: CPT

## 2020-10-28 PROCEDURE — 85610 PROTHROMBIN TIME: CPT

## 2020-10-28 PROCEDURE — 85730 THROMBOPLASTIN TIME PARTIAL: CPT

## 2020-10-28 PROCEDURE — 86850 RBC ANTIBODY SCREEN: CPT

## 2020-10-28 PROCEDURE — 36415 COLL VENOUS BLD VENIPUNCTURE: CPT

## 2020-10-28 PROCEDURE — 85027 COMPLETE CBC AUTOMATED: CPT

## 2020-10-28 PROCEDURE — 86900 BLOOD TYPING SEROLOGIC ABO: CPT

## 2020-10-28 PROCEDURE — 93010 ELECTROCARDIOGRAM REPORT: CPT

## 2020-10-28 PROCEDURE — 80048 BASIC METABOLIC PNL TOTAL CA: CPT

## 2020-10-28 PROCEDURE — 84702 CHORIONIC GONADOTROPIN TEST: CPT

## 2020-10-28 PROCEDURE — U0003: CPT

## 2020-10-28 PROCEDURE — 86901 BLOOD TYPING SEROLOGIC RH(D): CPT

## 2020-10-28 PROCEDURE — G0463: CPT

## 2020-10-28 PROCEDURE — 93005 ELECTROCARDIOGRAM TRACING: CPT

## 2020-10-28 PROCEDURE — 87640 STAPH A DNA AMP PROBE: CPT

## 2020-10-28 NOTE — PHYSICAL EXAM
[Respiratory Effort] : normal respiratory effort [Normal Rate and Rhythm] : normal rate and rhythm [No HSM] : no hepatosplenomegaly [Tender] : was nontender [Enlarged] : not enlarged [No Rash or Lesion] : No rash or lesion [Alert] : alert [Oriented to Person] : oriented to person [Oriented to Place] : oriented to place [Oriented to Time] : oriented to time [Anxious] : anxious [de-identified] : Appears well, no acute distress, ambulates easily into office and assumes examination table without need of assistance. [de-identified] : Normocephalic and atraumatic. [de-identified] : Supple with full range of motion. [FreeTextEntry1] : No cervical, supraclavicular, axillary or inguinal adenopathy. [de-identified] : Deferred. [de-identified] : Flat to scaphoid.\par Soft and non tense and non tender.\par Small, soft, rubbery cyst or small lipoma of soft tissues within RUQ/ epigastrium.\par Well-healed laparoscopy scars consistent with reported diagnostic laparoscopy and then appendectomy.\par ? scars in bilateral groins suggestive of inguinal hernia repair though patient and mother insist there is no such history- skin folds?\par No appreciable mass at this time.\par No appreciable collection at this time.\par No appreciable peritoneal irritation at this time.\par Will defer pelvic/ rectal exam to GYN consultants. [de-identified] : Normal external genitalia. [de-identified] : Deferred. [de-identified] : Grossly symmetric and within normal limits without any obvious motor or sensory deficits. [de-identified] : though not inappropriately so...

## 2020-10-28 NOTE — REVIEW OF SYSTEMS
[Feeling Poorly] : not feeling poorly [Feeling Tired] : not feeling tired [Abdominal Pain] : abdominal pain [Vomiting] : vomiting [Diarrhea] : no diarrhea [Heartburn] : no heartburn [Melena] : no melena [Skin Lesions] : skin lesion [Anxiety] : anxiety [Depression] : no depression [Negative] : Heme/Lymph [FreeTextEntry7] : nausea and vomiting during episodes of severe pain. [de-identified] : "small cyst... thing... on abdominal wall..." [de-identified] : regarding this issue and visit...

## 2020-10-28 NOTE — H&P PST ADULT - ASSESSMENT
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

## 2020-10-28 NOTE — H&P PST ADULT - LAB RESULTS AND INTERPRETATION
ADDENDUM 10/29/2020: Nose Culture obtained in PST on 10/28/2020 = Staph Aureus Detected AND MRSA PCR Detected. RX for Mupirocin Ointment 2 % E- Scribed to patients preferred pharmacy. Patient called and notified of results. Reinforced use of medication with patient who verbalizes understanding. Pt will also need IV Vanco prior to procedure. Results faxed to PCP as well as Dr Us and Dr Lizz Golden ANP

## 2020-10-28 NOTE — H&P PST ADULT - NSICDXPROBLEM_GEN_ALL_CORE_FT
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 Pt instructed to stop any NSAIDS/Herbal Supplements between now and procedure. May take Tylenol if needed for pain between now and procedure.  No medications needed morning of procedure. Pre-op instructions as well as pre-op wash instructions given to pt with understanding verbalized.     Problem: Polyp of corpus uteri  Assessment and Plan: See Above Assessment and Plan (#1)

## 2020-10-28 NOTE — H&P PST ADULT - LAST ECHOCARDIOGRAM
ECHO at [facility] in 2018 - notes she was having chest pain ECHO at [facility] in 2018 - notes she was having chest pain - ECHO revealed EF 70% - pt states she is not currently followed by Cardiology - ECHO on chart for anesthesia review

## 2020-10-28 NOTE — HISTORY OF PRESENT ILLNESS
[de-identified] : Very pleasant though understandably anxious lady arriving with her mother under the direction of their gastroenterologist.\par Ms. Ro reports a 2 to 3 month history of intermittent and low grade colicky or crampy type abdominal pain.\par She denies any associated systemic complaints and describes a poorly localized or migrating discomfort.\par Fortunately, she states that this pain has essentially resolved over the course of the last several weeks.\par Unfortunately, during the course of her extensive GI workup, a small bowel lesion and gynecologic Pathology were identified.

## 2020-10-28 NOTE — H&P PST ADULT - NSICDXPASTSURGICALHX_GEN_ALL_CORE_FT
PAST SURGICAL HISTORY:  H/O colonoscopy     H/O endoscopy     H/O knee surgery 2010- RIGHT Knee Arthroscopy    H/O toe surgery 2003  LEFT and RIGHT  Great Toe- removal of bone spurs    History of appendectomy 1997

## 2020-10-28 NOTE — DATA REVIEWED
[FreeTextEntry1] : Upper and lower endoscopy reviewed- Pathology report(s) requested\par CT scan report and images personally reviewed.\par MRE report also reviewed and discussed with patient and mother.\par Pelvic sonography from GI and GYN evaluations also noted and set to [employer].

## 2020-10-28 NOTE — H&P PST ADULT - NSICDXPASTMEDICALHX_GEN_ALL_CORE_FT
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

## 2020-10-28 NOTE — H&P PST ADULT - NEGATIVE ENMT SYMPTOMS
no throat pain/no dysphagia/no tinnitus/no sinus symptoms/no post-nasal discharge/no abnormal taste sensation/no nasal congestion/no hearing difficulty/no vertigo

## 2020-10-28 NOTE — H&P PST ADULT - HISTORY OF PRESENT ILLNESS
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  Pt notes  she was seen in [place] [facility] for "severe stomach pain" - pt notes CT scan showed mass in small intestine and something in uterus told her she needed to be seen." Pt noets she saw both GI and GYN - workups thru GYN DX Uterine Polyp and GI DX mass size of gum ball on small intestine (Following Colonoscopy/Endoscopy/MRI). Pt was then seen by Dr Zamudio as per GI as well as Dr Us. Following discussions with both surgeons regarding procedures they have both been scheduled for same day and patient is electing for scheduled procedure. Pt also notes she is electing for placement of IUD as well during procedure.

## 2020-10-28 NOTE — PLAN
[FreeTextEntry1] : 35 year old female without complex PMH, now with recent resolved or resolving abdominal pain.\par History of "a couple" of laparoscopies around the time of her distant appendectomy.\par However, small bowel lesion identified on CT and then confirmed on MRI concerning for carcinoid or GIST.\par Patient and family eager for excision based on their own wishes and recommendations of their GI and her GYN.\par In fact, their hope is to coordinate a joint procedure with a gynecologist to address her uterine polyp.\par They have specifically deferred a period of further watchful waiting with interval imaging or capsule endoscopy.\par Given her young age, degree of anxiety and the possibility of more concerning pathology as suggested by CT and MRI, indeed surgical excision with bowel resection for diagnosis and treatment seems reasonable and appropriate.  Risks, benefits, nature of this reviewed in detail with patient and mother, including but not limited to:\par -Nature of general anesthesia, intubation, Wright catheter insertion, possible  OG or NG insertion\par -Nature of laparoscopy, planned laparoscopic exploration, possible more extensive lysis of adhesions, plan bowel resection as laparoscopic or open procedure with estimate of size and number of expected or typical incisions- we have specifically discussed that at least one will be substantial to facilitate extraction and resection of specimen.\par -We have discussed short term risks of bleeding, infection, collection/  abscess, leakage or obstruction.\par -She understands that a short by inpatient stay is expected.\par -we have discussed that longer term risks exist of scarring or cosmetic deformity, adhesions or hernias which may complicate further future surgeries or lead to bowel obstruction\par -Imaging reveals no adenopathy or locally invasive disease.  However, pending final pathology report, a definitive diagnosis cannot be given until specimen is excised and the patient and her family have been told that if an aggressive disease process is present, further future resections or abdominal surgery may be required.\par Ms. Ro and her mother are pleased and agree.\par They demonstrate good insight, have no residual questions and are eager to proceed.\par -I have advised her that the small cyst or lipoma of the abdominal wall is likely to be out of the way of expected operative or trocar sites and I would not favor its removal unless truly symptomatic.\par -They leave in good spirits today to review her schedule and commitments.\par -I remain available and have encouraged them to call with additional questions or concerns.

## 2020-10-29 ENCOUNTER — NON-APPOINTMENT (OUTPATIENT)
Age: 35
End: 2020-10-29

## 2020-10-29 ENCOUNTER — TRANSCRIPTION ENCOUNTER (OUTPATIENT)
Age: 35
End: 2020-10-29

## 2020-10-29 LAB
MRSA PCR RESULT.: DETECTED
S AUREUS DNA NOSE QL NAA+PROBE: DETECTED

## 2020-10-29 RX ORDER — SODIUM CHLORIDE 9 MG/ML
1000 INJECTION, SOLUTION INTRAVENOUS
Refills: 0 | Status: DISCONTINUED | OUTPATIENT
Start: 2020-10-30 | End: 2020-10-30

## 2020-10-29 RX ORDER — MUPIROCIN 20 MG/G
1 OINTMENT TOPICAL
Qty: 1 | Refills: 0
Start: 2020-10-29 | End: 2020-11-02

## 2020-10-30 ENCOUNTER — APPOINTMENT (OUTPATIENT)
Dept: SURGERY | Facility: HOSPITAL | Age: 35
End: 2020-10-30

## 2020-10-30 ENCOUNTER — RESULT REVIEW (OUTPATIENT)
Age: 35
End: 2020-10-30

## 2020-10-30 ENCOUNTER — INPATIENT (INPATIENT)
Facility: HOSPITAL | Age: 35
LOS: 2 days | Discharge: HOME | DRG: 744 | End: 2020-11-02
Attending: OBSTETRICS & GYNECOLOGY | Admitting: OBSTETRICS & GYNECOLOGY
Payer: COMMERCIAL

## 2020-10-30 VITALS
HEART RATE: 75 BPM | RESPIRATION RATE: 16 BRPM | WEIGHT: 126.1 LBS | HEIGHT: 69 IN | OXYGEN SATURATION: 100 % | DIASTOLIC BLOOD PRESSURE: 65 MMHG | TEMPERATURE: 99 F | SYSTOLIC BLOOD PRESSURE: 112 MMHG

## 2020-10-30 DIAGNOSIS — N84.0 POLYP OF CORPUS UTERI: ICD-10-CM

## 2020-10-30 DIAGNOSIS — Z98.890 OTHER SPECIFIED POSTPROCEDURAL STATES: Chronic | ICD-10-CM

## 2020-10-30 DIAGNOSIS — Z90.49 ACQUIRED ABSENCE OF OTHER SPECIFIED PARTS OF DIGESTIVE TRACT: Chronic | ICD-10-CM

## 2020-10-30 PROCEDURE — 88305 TISSUE EXAM BY PATHOLOGIST: CPT | Mod: 26

## 2020-10-30 PROCEDURE — 44202 LAP ENTERECTOMY: CPT | Mod: 80

## 2020-10-30 PROCEDURE — 44120 REMOVAL OF SMALL INTESTINE: CPT

## 2020-10-30 PROCEDURE — 88307 TISSUE EXAM BY PATHOLOGIST: CPT | Mod: 26

## 2020-10-30 RX ORDER — HYDROMORPHONE HYDROCHLORIDE 2 MG/ML
0.5 INJECTION INTRAMUSCULAR; INTRAVENOUS; SUBCUTANEOUS
Refills: 0 | Status: DISCONTINUED | OUTPATIENT
Start: 2020-10-30 | End: 2020-10-30

## 2020-10-30 RX ORDER — PANTOPRAZOLE SODIUM 20 MG/1
40 TABLET, DELAYED RELEASE ORAL DAILY
Refills: 0 | Status: DISCONTINUED | OUTPATIENT
Start: 2020-10-30 | End: 2020-11-01

## 2020-10-30 RX ORDER — SODIUM CHLORIDE 9 MG/ML
1000 INJECTION, SOLUTION INTRAVENOUS
Refills: 0 | Status: DISCONTINUED | OUTPATIENT
Start: 2020-10-30 | End: 2020-10-30

## 2020-10-30 RX ORDER — FOLIC ACID/VIT B COMPLEX AND C 400 MCG
1250 TABLET ORAL
Qty: 0 | Refills: 0 | DISCHARGE

## 2020-10-30 RX ORDER — HYDROMORPHONE HYDROCHLORIDE 2 MG/ML
1 INJECTION INTRAMUSCULAR; INTRAVENOUS; SUBCUTANEOUS
Refills: 0 | Status: DISCONTINUED | OUTPATIENT
Start: 2020-10-30 | End: 2020-10-31

## 2020-10-30 RX ORDER — VANCOMYCIN HCL 1 G
750 VIAL (EA) INTRAVENOUS ONCE
Refills: 0 | Status: COMPLETED | OUTPATIENT
Start: 2020-10-30 | End: 2020-10-30

## 2020-10-30 RX ORDER — CEFOTETAN DISODIUM 1 G
2 VIAL (EA) INJECTION ONCE
Refills: 0 | Status: COMPLETED | OUTPATIENT
Start: 2020-10-30 | End: 2020-10-30

## 2020-10-30 RX ORDER — CHOLECALCIFEROL (VITAMIN D3) 125 MCG
1 CAPSULE ORAL
Qty: 0 | Refills: 0 | DISCHARGE

## 2020-10-30 RX ORDER — ZINC SULFATE TAB 220 MG (50 MG ZINC EQUIVALENT) 220 (50 ZN) MG
1 TAB ORAL
Qty: 0 | Refills: 0 | DISCHARGE

## 2020-10-30 RX ORDER — VITAMIN E 100 UNIT
1 CAPSULE ORAL
Qty: 0 | Refills: 0 | DISCHARGE

## 2020-10-30 RX ORDER — KETOROLAC TROMETHAMINE 30 MG/ML
30 SYRINGE (ML) INJECTION ONCE
Refills: 0 | Status: DISCONTINUED | OUTPATIENT
Start: 2020-10-30 | End: 2020-10-30

## 2020-10-30 RX ORDER — METOCLOPRAMIDE HCL 10 MG
10 TABLET ORAL ONCE
Refills: 0 | Status: DISCONTINUED | OUTPATIENT
Start: 2020-10-30 | End: 2020-10-30

## 2020-10-30 RX ORDER — ENOXAPARIN SODIUM 100 MG/ML
40 INJECTION SUBCUTANEOUS DAILY
Refills: 0 | Status: DISCONTINUED | OUTPATIENT
Start: 2020-10-30 | End: 2020-11-02

## 2020-10-30 RX ORDER — VALACYCLOVIR 500 MG/1
1 TABLET, FILM COATED ORAL
Qty: 0 | Refills: 0 | DISCHARGE

## 2020-10-30 RX ORDER — ACETAMINOPHEN 500 MG
1000 TABLET ORAL ONCE
Refills: 0 | Status: COMPLETED | OUTPATIENT
Start: 2020-10-30 | End: 2020-10-30

## 2020-10-30 RX ORDER — SODIUM CHLORIDE 9 MG/ML
1000 INJECTION, SOLUTION INTRAVENOUS
Refills: 0 | Status: DISCONTINUED | OUTPATIENT
Start: 2020-10-30 | End: 2020-11-01

## 2020-10-30 RX ORDER — CEFOTETAN DISODIUM 1 G
2 VIAL (EA) INJECTION EVERY 12 HOURS
Refills: 0 | Status: COMPLETED | OUTPATIENT
Start: 2020-10-30 | End: 2020-10-31

## 2020-10-30 RX ORDER — ONDANSETRON 8 MG/1
4 TABLET, FILM COATED ORAL ONCE
Refills: 0 | Status: DISCONTINUED | OUTPATIENT
Start: 2020-10-30 | End: 2020-10-30

## 2020-10-30 RX ORDER — ASCORBIC ACID 60 MG
1 TABLET,CHEWABLE ORAL
Qty: 0 | Refills: 0 | DISCHARGE

## 2020-10-30 RX ORDER — ACETAMINOPHEN 500 MG
650 TABLET ORAL EVERY 6 HOURS
Refills: 0 | Status: DISCONTINUED | OUTPATIENT
Start: 2020-10-30 | End: 2020-11-01

## 2020-10-30 RX ORDER — OXYCODONE HYDROCHLORIDE 5 MG/1
5 TABLET ORAL EVERY 4 HOURS
Refills: 0 | Status: DISCONTINUED | OUTPATIENT
Start: 2020-10-30 | End: 2020-11-01

## 2020-10-30 RX ORDER — SENNA PLUS 8.6 MG/1
2 TABLET ORAL AT BEDTIME
Refills: 0 | Status: DISCONTINUED | OUTPATIENT
Start: 2020-10-30 | End: 2020-11-02

## 2020-10-30 RX ADMIN — SODIUM CHLORIDE 75 MILLILITER(S): 9 INJECTION, SOLUTION INTRAVENOUS at 18:49

## 2020-10-30 RX ADMIN — Medication 250 MILLIGRAM(S): at 12:26

## 2020-10-30 RX ADMIN — HYDROMORPHONE HYDROCHLORIDE 0.5 MILLIGRAM(S): 2 INJECTION INTRAMUSCULAR; INTRAVENOUS; SUBCUTANEOUS at 19:38

## 2020-10-30 RX ADMIN — HYDROMORPHONE HYDROCHLORIDE 1 MILLIGRAM(S): 2 INJECTION INTRAMUSCULAR; INTRAVENOUS; SUBCUTANEOUS at 23:46

## 2020-10-30 RX ADMIN — SODIUM CHLORIDE 60 MILLILITER(S): 9 INJECTION, SOLUTION INTRAVENOUS at 11:30

## 2020-10-30 RX ADMIN — Medication 1000 MILLIGRAM(S): at 19:36

## 2020-10-30 RX ADMIN — ENOXAPARIN SODIUM 40 MILLIGRAM(S): 100 INJECTION SUBCUTANEOUS at 19:45

## 2020-10-30 RX ADMIN — Medication 400 MILLIGRAM(S): at 18:49

## 2020-10-30 NOTE — BRIEF OPERATIVE NOTE - NSICDXBRIEFPREOP_GEN_ALL_CORE_FT
PRE-OP DIAGNOSIS:  Uterine polyp 30-Oct-2020 15:59:09  Lizzeth Mancuso  
PRE-OP DIAGNOSIS:  Small bowel lesion 30-Oct-2020 18:56:31  Kavon Zamudio

## 2020-10-30 NOTE — BRIEF OPERATIVE NOTE - NSICDXBRIEFPROCEDURE_GEN_ALL_CORE_FT
PROCEDURES:  IUD insertion 30-Oct-2020 16:00:26  Lizzeth Mancuso  Hysteroscopic polypectomy of uterus 30-Oct-2020 15:59:32  Lizzeth Mancuso  
PROCEDURES:  Laparoscopic enterectomy 30-Oct-2020 18:56:17  Kavon Zamudio J

## 2020-10-30 NOTE — BRIEF OPERATIVE NOTE - NSICDXBRIEFPOSTOP_GEN_ALL_CORE_FT
POST-OP DIAGNOSIS:  Uterine polyp 30-Oct-2020 16:00:46  Lizzeth Mancuso  
POST-OP DIAGNOSIS:  Small bowel lesion 30-Oct-2020 18:56:52  Kavon Zamudio

## 2020-10-30 NOTE — PROGRESS NOTE ADULT - SUBJECTIVE AND OBJECTIVE BOX
Post Operative Note  Patient: SIDNEY FLORES 35y (1985) Female   MRN: [id number]  Location: [address]  Visit: 10-30-20 Inpatient  Date: 10-30-20 @ 23:22    Procedure: s/p sbr with primary anastomosis, hysteroscopic polypectomy of uterus     Subjective:   36 y/o F seen and examined at bedside. Pt c/o incisional abd pain however in NAD. PT currently NPO without N/V. PT denies dizziness, chest pain, sob, nausea, vomiting, abdominal pain, or urinary complaints.    Objective:  Vitals: T(F): 98.2 (10-30-20 @ 21:15), Max: 99 (10-30-20 @ 19:58)  HR: 86 (10-30-20 @ 21:15)  BP: 116/67 (10-30-20 @ 21:15) (105/45 - 116/67)  RR: 16 (10-30-20 @ 21:15)  SpO2: 99% (10-30-20 @ 20:30)      In:   10-30-20 @ 07:01  -  10-30-20 @ 23:22  --------------------------------------------------------  IN: 245 mL      IV Fluids: lactated ringers. 1000 milliLiter(s) (75 mL/Hr) IV Continuous <Continuous>      Out:   10-30-20 @ 07:01  -  10-30-20 @ 23:22  --------------------------------------------------------  OUT: 0 mL    Voided Urine:   10-30-20 @ 07:01  -  10-30-20 @ 23:22  --------------------------------------------------------  OUT: 0 mL      PHYSICAL EXAM  GENERAL:  Well-nourished, well-developed Female lying comfortably in bed in NAD  HEENT:  Sclera white. Mucous membranes moist.  CARDIO:  RRR, no MRG's  RESPIRATORY: CTABL, no ronchi, rales or wheezing  ABDOMEN:  Soft, incisional ttp, nondistended, +bs, dressings c/d/i,   EXTREMITIES: No calf tenderness  SKIN:  No jaundice, pallor, or cyanosis  NEURO:  A&O x 3      Medications: [Standing]  acetaminophen   Tablet .. 650 milliGRAM(s) Oral every 6 hours PRN  cefoTEtan  IVPB 2 Gram(s) IV Intermittent every 12 hours  enoxaparin Injectable 40 milliGRAM(s) SubCutaneous daily  HYDROmorphone  Injectable 1 milliGRAM(s) IV Push every 3 hours PRN  lactated ringers. 1000 milliLiter(s) IV Continuous <Continuous>  oxyCODONE    IR 5 milliGRAM(s) Oral every 4 hours PRN  pantoprazole  Injectable 40 milliGRAM(s) IV Push daily  senna 2 Tablet(s) Oral at bedtime    Medications: [PRN]  acetaminophen   Tablet .. 650 milliGRAM(s) Oral every 6 hours PRN  cefoTEtan  IVPB 2 Gram(s) IV Intermittent every 12 hours  enoxaparin Injectable 40 milliGRAM(s) SubCutaneous daily  HYDROmorphone  Injectable 1 milliGRAM(s) IV Push every 3 hours PRN  lactated ringers. 1000 milliLiter(s) IV Continuous <Continuous>  oxyCODONE    IR 5 milliGRAM(s) Oral every 4 hours PRN  pantoprazole  Injectable 40 milliGRAM(s) IV Push daily  senna 2 Tablet(s) Oral at bedtime    Assessment:  36 y/o F s/p sbr primary anastomosis, hysteroscopic polypectomy of uterus, currently NPO     Plan:  - cont NPO, adv to [facility] in am   - dvt prophylaxis with lovenox started tonight given coagulopathic risk   - Cont post-op IV abx   - Pain control, supportive care  - Zofran PRN for nausea   - Incentive spirometry  - OOB, ambulation as tolerated  - SCDs  - Follow up AM labs  - Will continue to monitor

## 2020-10-31 LAB
ANION GAP SERPL CALC-SCNC: 4 MMOL/L — LOW (ref 5–17)
BASOPHILS # BLD AUTO: 0.01 K/UL — SIGNIFICANT CHANGE UP (ref 0–0.2)
BASOPHILS NFR BLD AUTO: 0.1 % — SIGNIFICANT CHANGE UP (ref 0–2)
BUN SERPL-MCNC: 8 MG/DL — SIGNIFICANT CHANGE UP (ref 7–23)
CALCIUM SERPL-MCNC: 8.5 MG/DL — SIGNIFICANT CHANGE UP (ref 8.5–10.1)
CHLORIDE SERPL-SCNC: 110 MMOL/L — HIGH (ref 96–108)
CO2 SERPL-SCNC: 28 MMOL/L — SIGNIFICANT CHANGE UP (ref 22–31)
CREAT SERPL-MCNC: 1.1 MG/DL — SIGNIFICANT CHANGE UP (ref 0.5–1.3)
EOSINOPHIL # BLD AUTO: 0.01 K/UL — SIGNIFICANT CHANGE UP (ref 0–0.5)
EOSINOPHIL NFR BLD AUTO: 0.1 % — SIGNIFICANT CHANGE UP (ref 0–6)
GLUCOSE SERPL-MCNC: 93 MG/DL — SIGNIFICANT CHANGE UP (ref 70–99)
HCT VFR BLD CALC: 34.8 % — SIGNIFICANT CHANGE UP (ref 34.5–45)
HGB BLD-MCNC: 12 G/DL — SIGNIFICANT CHANGE UP (ref 11.5–15.5)
IMM GRANULOCYTES NFR BLD AUTO: 0.4 % — SIGNIFICANT CHANGE UP (ref 0–1.5)
LYMPHOCYTES # BLD AUTO: 1.34 K/UL — SIGNIFICANT CHANGE UP (ref 1–3.3)
LYMPHOCYTES # BLD AUTO: 15.6 % — SIGNIFICANT CHANGE UP (ref 13–44)
MAGNESIUM SERPL-MCNC: 2.1 MG/DL — SIGNIFICANT CHANGE UP (ref 1.6–2.6)
MCHC RBC-ENTMCNC: 34.2 PG — HIGH (ref 27–34)
MCHC RBC-ENTMCNC: 34.5 GM/DL — SIGNIFICANT CHANGE UP (ref 32–36)
MCV RBC AUTO: 99.1 FL — SIGNIFICANT CHANGE UP (ref 80–100)
MONOCYTES # BLD AUTO: 0.66 K/UL — SIGNIFICANT CHANGE UP (ref 0–0.9)
MONOCYTES NFR BLD AUTO: 7.7 % — SIGNIFICANT CHANGE UP (ref 2–14)
NEUTROPHILS # BLD AUTO: 6.52 K/UL — SIGNIFICANT CHANGE UP (ref 1.8–7.4)
NEUTROPHILS NFR BLD AUTO: 76.1 % — SIGNIFICANT CHANGE UP (ref 43–77)
NRBC # BLD: 0 /100 WBCS — SIGNIFICANT CHANGE UP (ref 0–0)
PLATELET # BLD AUTO: 176 K/UL — SIGNIFICANT CHANGE UP (ref 150–400)
POTASSIUM SERPL-MCNC: 4.5 MMOL/L — SIGNIFICANT CHANGE UP (ref 3.5–5.3)
POTASSIUM SERPL-SCNC: 4.5 MMOL/L — SIGNIFICANT CHANGE UP (ref 3.5–5.3)
RBC # BLD: 3.51 M/UL — LOW (ref 3.8–5.2)
RBC # FLD: 13.3 % — SIGNIFICANT CHANGE UP (ref 10.3–14.5)
SODIUM SERPL-SCNC: 142 MMOL/L — SIGNIFICANT CHANGE UP (ref 135–145)
WBC # BLD: 8.57 K/UL — SIGNIFICANT CHANGE UP (ref 3.8–10.5)
WBC # FLD AUTO: 8.57 K/UL — SIGNIFICANT CHANGE UP (ref 3.8–10.5)

## 2020-10-31 RX ORDER — KETOROLAC TROMETHAMINE 30 MG/ML
15 SYRINGE (ML) INJECTION EVERY 6 HOURS
Refills: 0 | Status: DISCONTINUED | OUTPATIENT
Start: 2020-10-31 | End: 2020-11-01

## 2020-10-31 RX ORDER — KETOROLAC TROMETHAMINE 30 MG/ML
15 SYRINGE (ML) INJECTION ONCE
Refills: 0 | Status: DISCONTINUED | OUTPATIENT
Start: 2020-10-31 | End: 2020-10-31

## 2020-10-31 RX ORDER — HYDROMORPHONE HYDROCHLORIDE 2 MG/ML
0.5 INJECTION INTRAMUSCULAR; INTRAVENOUS; SUBCUTANEOUS EVERY 4 HOURS
Refills: 0 | Status: DISCONTINUED | OUTPATIENT
Start: 2020-10-31 | End: 2020-11-02

## 2020-10-31 RX ADMIN — Medication 100 GRAM(S): at 03:30

## 2020-10-31 RX ADMIN — Medication 100 GRAM(S): at 11:38

## 2020-10-31 RX ADMIN — HYDROMORPHONE HYDROCHLORIDE 0.5 MILLIGRAM(S): 2 INJECTION INTRAMUSCULAR; INTRAVENOUS; SUBCUTANEOUS at 11:55

## 2020-10-31 RX ADMIN — ENOXAPARIN SODIUM 40 MILLIGRAM(S): 100 INJECTION SUBCUTANEOUS at 11:38

## 2020-10-31 RX ADMIN — SENNA PLUS 2 TABLET(S): 8.6 TABLET ORAL at 22:02

## 2020-10-31 RX ADMIN — HYDROMORPHONE HYDROCHLORIDE 1 MILLIGRAM(S): 2 INJECTION INTRAMUSCULAR; INTRAVENOUS; SUBCUTANEOUS at 00:02

## 2020-10-31 RX ADMIN — HYDROMORPHONE HYDROCHLORIDE 0.5 MILLIGRAM(S): 2 INJECTION INTRAMUSCULAR; INTRAVENOUS; SUBCUTANEOUS at 11:38

## 2020-10-31 RX ADMIN — Medication 15 MILLIGRAM(S): at 10:17

## 2020-10-31 RX ADMIN — Medication 15 MILLIGRAM(S): at 19:53

## 2020-10-31 RX ADMIN — Medication 15 MILLIGRAM(S): at 10:30

## 2020-10-31 RX ADMIN — OXYCODONE HYDROCHLORIDE 5 MILLIGRAM(S): 5 TABLET ORAL at 15:39

## 2020-10-31 RX ADMIN — Medication 15 MILLIGRAM(S): at 20:10

## 2020-10-31 RX ADMIN — HYDROMORPHONE HYDROCHLORIDE 0.5 MILLIGRAM(S): 2 INJECTION INTRAMUSCULAR; INTRAVENOUS; SUBCUTANEOUS at 21:56

## 2020-10-31 RX ADMIN — SODIUM CHLORIDE 75 MILLILITER(S): 9 INJECTION, SOLUTION INTRAVENOUS at 19:53

## 2020-10-31 RX ADMIN — OXYCODONE HYDROCHLORIDE 5 MILLIGRAM(S): 5 TABLET ORAL at 06:26

## 2020-10-31 RX ADMIN — OXYCODONE HYDROCHLORIDE 5 MILLIGRAM(S): 5 TABLET ORAL at 16:30

## 2020-10-31 RX ADMIN — OXYCODONE HYDROCHLORIDE 5 MILLIGRAM(S): 5 TABLET ORAL at 05:48

## 2020-10-31 RX ADMIN — PANTOPRAZOLE SODIUM 40 MILLIGRAM(S): 20 TABLET, DELAYED RELEASE ORAL at 11:38

## 2020-10-31 RX ADMIN — HYDROMORPHONE HYDROCHLORIDE 0.5 MILLIGRAM(S): 2 INJECTION INTRAMUSCULAR; INTRAVENOUS; SUBCUTANEOUS at 22:11

## 2020-10-31 NOTE — CONSULT NOTE ADULT - ASSESSMENT
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 Whether benign or malignant.   Final discussion if any adjuvant tx needed dependent on results.     Post-operative care per surgery and gyn  pain meds.   Constipation prophylaxis   OOB as yvonne.     DVT Prophylaxis  Moderate to high VTE risk  on SQ Lovenox.   Recommend continue Lovenox 40mg SQ x7-10 d post surgery.   Monitor for bleeding.     I have discussed the above plan of care with patient/ brother in detail. They expressed understanding of the treatment plan . Risks, benefits and alternatives discussed in detail. I have asked if they have any questions or concerns and appropriately addressed them; all questions answered to their satisfactions and in lay terms.     > 61 minutes spent in direct patient care, examining and counseling patient,  reviewing  the notes, lab data/ imaging , discussion with multidisciplinary team.     Contact information given for office.     Thank you for consulting us.   No additional recommendations at current time.   Will sign off on case for now.   Please call, or re-consult if needed.

## 2020-10-31 NOTE — PROGRESS NOTE ADULT - ATTENDING COMMENTS
Pt was seen this AM on surgical rounds. She was in pain and pain management was adjusted Pt. was placed on Toradol and X strength Tylenol. Will try to minimize narcs. Other then that pt is doing very well.

## 2020-10-31 NOTE — PROGRESS NOTE ADULT - SUBJECTIVE AND OBJECTIVE BOX
SUBJECTIVE:  Patient seen and examined at bedside.  No overnight events. C/o abdominal pain surrounding incision sites. She is currently NPO, but would like some clear liquids.   Patient denies any fevers, chills, chest pain, shortness of breath, nausea, vomiting or diarrhea.    Vital Signs Last 24 Hrs  T(C): 36.8 (31 Oct 2020 04:59), Max: 37.2 (30 Oct 2020 19:58)  T(F): 98.2 (31 Oct 2020 04:59), Max: 99 (30 Oct 2020 19:58)  HR: 75 (31 Oct 2020 04:59) (72 - 104)  BP: 99/62 (31 Oct 2020 04:59) (99/62 - 117/71)  BP(mean): --  RR: 18 (31 Oct 2020 04:59) (13 - 18)  SpO2: 96% (31 Oct 2020 04:59) (96% - 100%)    PHYSICAL EXAM:  GENERAL: NAD, resting in bed, appears comfortable  HEAD:  Atraumatic, Normocephalicgallops  ABDOMEN: Dressings clean, dry and intact. Abd soft, nondistended. Tender to palpation surrounding incisions. +BS   NEUROLOGY: A&O x 3    LABS:                        12.0   8.57  )-----------( 176      ( 31 Oct 2020 05:48 )             34.8     10-31    142  |  110<H>  |  8   ----------------------------<  93  4.5   |  28  |  1.10    Ca    8.5      31 Oct 2020 05:48  Mg     2.1     10-31    ASSESSMENT  35y Female POD1 s/p SBR and hysteroscopic polypectomy of uterus    PLAN  - Continue current care  - pain control, supportive care, OOB  - Clear liquid diet, f/u diet tolerance    - continue abx x 2 postop doses  - continue DVT ppx   - serial abdominal exams  - labs in AM  - Will discuss with Dr. Mitchell (covering Dr. Zamudio today)    Surgical Team Contact Information  Spectralink: Ext: [phone] or [phone]  Pager: [phone]

## 2020-10-31 NOTE — CONSULT NOTE ADULT - SUBJECTIVE AND OBJECTIVE BOX
INCOMPLETE NOTE.  Documentation in Progress  PT SEEN AND EVALUATED.   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  Pt notes  she was seen in [facility] for "severe stomach pain" - pt notes CT scan showed mass in small intestine and something in uterus told her she needed to be seen." Pt noets she saw both GI and GYN - workups thru GYN DX Uterine Polyp and GI DX mass size of gum ball on small intestine (Following Colonoscopy/Endoscopy/MRI). Pt was then seen by Dr Zamudio as per GI as well as Dr Us. Following discussions with both surgeons regarding procedures they have both been scheduled for same day and patient is electing for scheduled procedure. Pt also notes she is electing for placement of IUD as well during procedure.  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    Polyp of corpus uteri            Polyp of corpus uteri [N84.0]    Polyp of corpus uteri [N84.0]    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 [D37.2]    History of ovarian cyst [Z87.42]    Prothrombin gene mutation [D68.52]    H/O endoscopy [Z98.890]    H/O colonoscopy [Z98.890]    H/O knee surgery [Z98.890]    H/O toe surgery [Z98.890]    History of appendectomy [Z90.49]        FAMILY HISTORY:  Family history of hypercholesterolemia  Father - Alive Age 73          [SOCIAL HISTORY: ]     smoking:       EtOH:       illicit drugs:       occupation:       marital status:       Other:       [ALLERGIES/INTOLERANCES:]  Allergies    No Known Allergies    Intolerances          [MEDICATIONS]  MEDICATIONS  (STANDING):  enoxaparin Injectable 40 milliGRAM(s) SubCutaneous daily  lactated ringers. 1000 milliLiter(s) (75 mL/Hr) IV Continuous <Continuous>  pantoprazole  Injectable 40 milliGRAM(s) IV Push daily  senna 2 Tablet(s) Oral at bedtime    MEDICATIONS  (PRN):  acetaminophen   Tablet .. 650 milliGRAM(s) Oral every 6 hours PRN Temp greater or equal to 38C (100.4F), Mild Pain (1 - 3)  HYDROmorphone  Injectable 0.5 milliGRAM(s) IV Push every 4 hours PRN Severe Pain (7 - 10)  ketorolac   Injectable 15 milliGRAM(s) IV Push every 6 hours PRN Severe Pain (7 - 10)  oxyCODONE    IR 5 milliGRAM(s) Oral every 4 hours PRN Moderate Pain (4 - 6)        [REVIEW OF SYSTEMS: ]  CONSTITUTIONAL: normal, no fever, no shakes, no chills   EYES: No eye pain, no visual disturbances, no discharge  ENMT:  no discharge  NECK: No pain, no stiffness  BREASTS: No pain, no masses, no nipple discharge  RESPIRATORY: No cough, no wheezing, no chills, no hemoptysis; No shortness of breath  CARDIOVASCULAR: No chest pain, no palpitations, no dizziness, no leg swelling  GASTROINTESTINAL: No abdominal, no epigastric pain. No nausea, no vomiting, no hematemesis; No diarrhea , no constipation. No melena, no hematochezia.  GENITOURINARY: No dysuria, no frequency, no hematuria, no incontinence  NEUROLOGICAL: No headaches, no memory loss, no loss of strength, no numbness, no tremors  SKIN: No itching, no burning, no rashes, no lesions   LYMPH NODES: No enlarged glands  ENDOCRINE: No heat or cold intolerance; No hair loss  MUSCULOSKELETAL: No joint pain or swelling; No muscle, no back, no extremity pain  PSYCHIATRIC: No depression, no anxiety, no mood swings, no difficulty sleeping  HEME/LYMPH: No easy bruising, no bleeding gums      [VITALS SIGNS 24hrs]  Vital Signs Last 24 Hrs  T(C): 36.8 (31 Oct 2020 12:25), Max: 37.2 (30 Oct 2020 19:58)  T(F): 98.3 (31 Oct 2020 12:25), Max: 99 (30 Oct 2020 19:58)  HR: 61 (31 Oct 2020 12:25) (61 - 104)  BP: 108/66 (31 Oct 2020 12:25) (99/62 - 117/71)  BP(mean): --  RR: 17 (31 Oct 2020 12:25) (13 - 18)  SpO2: 99% (31 Oct 2020 12:25) (96% - 100%)    [PHYSICAL EXAM]  General: adult in NAD,  WN,  WD.  HEENT: clear oropharynx, anicteric sclera, pink conjunctivae.  Neck: supple, no masses.  CV: normal S1S2, no murmur, no rubs, no gallops.  Lungs: clear to auscultation, no wheezes, no rales, no rhonchi.  Abdomen: soft, non-tender, non-distended, no hepatosplenomegaly, normal BS, no guarding.  Ext: no clubbing, no cyanosis, no edema.  Skin: no rashes,  no petechiae, no venous stasis changes.  Neuro: alert and oriented X3, no focal motor deficits.  LN: no SC NATHAN.      [LABS: ]                        12.0   8.57  )-----------( 176      ( 31 Oct 2020 05:48 )             34.8     CBC Full  -  ( 31 Oct 2020 05:48 )  WBC Count : 8.57 K/uL  RBC Count : 3.51 M/uL  Hemoglobin : 12.0 g/dL  Hematocrit : 34.8 %  Platelet Count - Automated : 176 K/uL  Mean Cell Volume : 99.1 fl  Mean Cell Hemoglobin : 34.2 pg  Mean Cell Hemoglobin Concentration : 34.5 gm/dL  Auto Neutrophil # : 6.52 K/uL  Auto Lymphocyte # : 1.34 K/uL  Auto Monocyte # : 0.66 K/uL  Auto Eosinophil # : 0.01 K/uL  Auto Basophil # : 0.01 K/uL  Auto Neutrophil % : 76.1 %  Auto Lymphocyte % : 15.6 %  Auto Monocyte % : 7.7 %  Auto Eosinophil % : 0.1 %  Auto Basophil % : 0.1 %    10-31    142  |  110<H>  |  8   ----------------------------<  93  4.5   |  28  |  1.10    Ca    8.5      31 Oct 2020 05:48  Mg     2.1     10-31                  WBC  TREND (5 Days)  WBC Count: 8.57 K/uL (10-31 @ 05:48)    HGB  TREND (5 Days)  Hemoglobin: 12.0 g/dL (10-31 @ 05:48)    HCT  TREND (5 Days)  Hematocrit: 34.8 % (10-31 @ 05:48)    PLT  TREND (5 Days)  Platelet Count - Automated: 176 K/uL (10-31 @ 05:48)        [PATHOLOGY]         [RADIOLOGY & ADDITIONAL STUDIES:]        Patient is a 35y old  Female who presents with a chief complaint of s/p sbr primary anastomosis, hysteroscopic polypectomy of uterus (30 Oct 2020 23:21)      HPI:  35 year old female  - 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  Pt notes  she was seen in [facility] for "severe stomach pain" - pt notes CT scan showed mass in small intestine and something in uterus told her she needed to be seen." Pt noets she saw both GI and GYN - workups thru GYN DX Uterine Polyp and GI DX mass size of gum ball on small intestine (Following Colonoscopy/Endoscopy/MRI). Pt was then seen by Dr Zamudio as per GI as well as Dr Us. Following discussions with both surgeons regarding procedures they have both been scheduled for same day and patient is electing for scheduled procedure. Pt also notes she is electing for placement of IUD as well during procedure.  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  Polyp of corpus uteri    Polyp of corpus uteri [N84.0]  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 [D37.2]  History of ovarian cyst [Z87.42]  Prothrombin gene mutation [D68.52]  H/O endoscopy [Z98.890]  H/O colonoscopy [Z98.890]  H/O knee surgery [Z98.890]  H/O toe surgery [Z98.890]  History of appendectomy [Z90.49]      FAMILY HISTORY:  Family history of hypercholesterolemia  Father - Alive Age 73    mother 61yo  father 72yo  older sister: Negative for [id number] mutation  younger brother:  Negative for [id number] mutation      [SOCIAL HISTORY: ]     smoking:  denies     EtOH:  denies     illicit drugs:  denies     occupation:  works in real estate, 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 1000 milliLiter(s) (75 mL/Hr) IV Continuous <Continuous>  pantoprazole  Injectable 40 milliGRAM(s) IV Push daily  senna 2 Tablet(s) Oral at bedtime    MEDICATIONS  (PRN):  acetaminophen   Tablet .. 650 milliGRAM(s) Oral every 6 hours PRN Temp greater or equal to 38C (100.4F), Mild Pain (1 - 3)  HYDROmorphone  Injectable 0.5 milliGRAM(s) IV Push every 4 hours PRN Severe Pain (7 - 10)  ketorolac   Injectable 15 milliGRAM(s) IV Push every 6 hours PRN Severe Pain (7 - 10)  oxyCODONE    IR 5 milliGRAM(s) Oral every 4 hours PRN Moderate Pain (4 - 6)      [REVIEW OF SYSTEMS: ]  CONSTITUTIONAL: normal, no fever, no shakes, no chills   EYES: No eye pain, no visual disturbances, no discharge  ENMT:  no discharge  NECK: No pain, no stiffness  BREASTS: No pain, no masses, no nipple discharge  RESPIRATORY: No cough, no wheezing, no chills, no hemoptysis; No shortness of breath  CARDIOVASCULAR: No chest pain, no palpitations, no dizziness, no leg swelling  GASTROINTESTINAL: No abdominal, no epigastric pain. No nausea, no vomiting, no hematemesis; No diarrhea , no constipation. No melena, no hematochezia.  GENITOURINARY: No dysuria, no frequency, no hematuria, no incontinence  NEUROLOGICAL: No headaches, no memory loss, no loss of strength, no numbness, no tremors  SKIN: No itching, no burning, no rashes, no lesions   LYMPH NODES: No enlarged glands  ENDOCRINE: No heat or cold intolerance; No hair loss  MUSCULOSKELETAL: No joint pain or swelling; No muscle, no back, no extremity pain  PSYCHIATRIC: No depression, no anxiety, no mood swings, no difficulty sleeping  HEME/LYMPH: No easy bruising, no bleeding gums      [VITALS SIGNS 24hrs]  Vital Signs Last 24 Hrs  T(C): 36.8 (31 Oct 2020 12:25), Max: 37.2 (30 Oct 2020 19:58)  T(F): 98.3 (31 Oct 2020 12:25), Max: 99 (30 Oct 2020 19:58)  HR: 61 (31 Oct 2020 12:25) (61 - 104)  BP: 108/66 (31 Oct 2020 12:25) (99/62 - 117/71)  BP(mean): --  RR: 17 (31 Oct 2020 12:25) (13 - 18)  SpO2: 99% (31 Oct 2020 12:25) (96% - 100%)      [PHYSICAL EXAM]  General: adult in NAD,  WN,  WD.  HEENT: clear oropharynx, anicteric sclera, pink conjunctivae.  Neck: supple, no masses.  CV: normal S1S2, no murmur, no rubs, no gallops.  Lungs: clear to auscultation, no wheezes, no rales, no rhonchi.  Abdomen: soft, non-tender, non-distended, no hepatosplenomegaly, normal BS, no guarding.  Ext: no clubbing, no cyanosis, no edema.  Skin: no rashes,  no petechiae, no venous stasis changes.  Neuro: alert and oriented X3, no focal motor deficits.  LN: no SC NATHAN.      [LABS: ]                        12.0   8.57  )-----------( 176      ( 31 Oct 2020 05:48 )             34.8     CBC Full  -  ( 31 Oct 2020 05:48 )  WBC Count : 8.57 K/uL  RBC Count : 3.51 M/uL  Hemoglobin : 12.0 g/dL  Hematocrit : 34.8 %  Platelet Count - Automated : 176 K/uL  Mean Cell Volume : 99.1 fl  Mean Cell Hemoglobin : 34.2 pg  Mean Cell Hemoglobin Concentration : 34.5 gm/dL  Auto Neutrophil # : 6.52 K/uL  Auto Lymphocyte # : 1.34 K/uL  Auto Monocyte # : 0.66 K/uL  Auto Eosinophil # : 0.01 K/uL  Auto Basophil # : 0.01 K/uL  Auto Neutrophil % : 76.1 %  Auto Lymphocyte % : 15.6 %  Auto Monocyte % : 7.7 %  Auto Eosinophil % : 0.1 %  Auto Basophil % : 0.1 %    10-    142  |  110<H>  |  8   ----------------------------<  93  4.5   |  28  |  1.10    Ca    8.5      31 Oct 2020 05:48  Mg     2.1     10-31      WBC  TREND (5 Days)  WBC Count: 8.57 K/uL (10-31 @ 05:48)    HGB  TREND (5 Days)  Hemoglobin: 12.0 g/dL (10-31 @ 05:48)    HCT  TREND (5 Days)  Hematocrit: 34.8 % (10-31 @ 05:48)    PLT  TREND (5 Days)  Platelet Count - Automated: 176 K/uL (10-31 @ 05:48)        [PATHOLOGY]         [RADIOLOGY & ADDITIONAL STUDIES:]       < from: CT Abdomen and Pelvis w/ IV Cont (20 @ 18:14) >    EXAM:  CT ABDOMEN AND PELVIS IC                                  PROCEDURE DATE:  2020          INTERPRETATION:  CLINICAL INFORMATION: Abdominal pain    COMPARISON: Pelvic ultrasound of earlier in the day    PROCEDURE:  CT of the Abdomen and Pelvis was performed with intravenous contrast.  Intravenous contrast: 85 ml Omnipaque 350. 15 ml discarded.  Oral contrast: None.  Sagittal and coronal reformats were performed.  FINDINGS:  ·	LOWER CHEST: Within normal limits.  ·	LIVER: Within normallimits.  ·	BILE DUCTS: Normal caliber.  ·	GALLBLADDER: Within normal limits.  ·	SPLEEN: Within normal limits.  ·	PANCREAS: Within normal limits.  ·	ADRENALS: Within normal limits.  ·	KIDNEYS/URETERS: Within normal limits.  ·	BLADDER: Within normal limits.  ·	REPRODUCTIVE ORGANS: Uterus and adnexa within normal limits.  ·	BOWEL: No bowel obstruction. Appendix is surgically absent. There is a soft tissue mass with peripheral calcification in the small bowel in the pelvis in the midline. This measures approximately 1.4 cm on image 124 of series 3 and is seen on coronal image 49 and sagittal image 53.  ·	PERITONEUM: No ascites.  ·	VESSELS: Within normal limits.  ·	RETROPERITONEUM/LYMPH NODES: No lymphadenopathy.  ·	ABDOMINAL WALL: Within normal limits.  ·	BONES: Within normal limits.  IMPRESSION:   ·	Well circumscribed small calcified soft tissue mass in the distal small bowel in the pelvis. Differential diagnosis includes Gist tumor, leiomyoma or carcinoid. Additional evaluation with PET scan or capsule endoscopy maybe helpful. Correlation with any prior studies if available would be helpful as well  < end of copied text >        < from: US Transvaginal (.20 @ 17:07) >  EXAM:  US TRANSVAGINAL                        PROCEDURE DATE:  2020    INTERPRETATION:  CLINICAL INFORMATION: Suprapubic pelvic pain.  LMP: 2020.  COMPARISON: None  TECHNIQUE:  Endovaginal and transabdominal pelvic sonogram. Color and Spectral Doppler was performed.  FINDINGS:  ·	Uterus: 8.1 x 5.1 x 3.9 cm. No space-occupying lesions of the uterine myometrium identified.  ·	Endometrium: 6 mm. Heterogeneous echotexture is identified within the endometrial region including a 5 x 4 x 4 mm hypoechoic focus with punctate echogenic foci noted within the endometrial canal region.  ·	Right ovary: 2.4 x 2.0 x 1.4 cm. Within normal limits. Blood flow is identified within the right ovarian parenchyma.  ·	Left ovary: 3.3 x3.0 x 1.8 cm. A 1.6 x 1.6 x 1.4 cm cyst is identified within the left ovarian parenchyma. Blood flow is identified within the left ovarian parenchyma.  Fluid: Trace free fluid is identified within the pelvic cul-de-sac region.  IMPRESSION:  ·	Heterogeneous echotexture is identified within the endometrial region including a 5 x 4 x 4 mm hypoechoic focus with punctate echogenic foci noted within the endometrial canal region. Sonohysterography can be performed for further assessment. 1.6 cm left ovarian cyst.  < end of copied text >

## 2020-11-01 ENCOUNTER — TRANSCRIPTION ENCOUNTER (OUTPATIENT)
Age: 35
End: 2020-11-01

## 2020-11-01 RX ORDER — ACETAMINOPHEN 500 MG
1000 TABLET ORAL EVERY 6 HOURS
Refills: 0 | Status: DISCONTINUED | OUTPATIENT
Start: 2020-11-01 | End: 2020-11-02

## 2020-11-01 RX ORDER — KETOROLAC TROMETHAMINE 30 MG/ML
15 SYRINGE (ML) INJECTION EVERY 6 HOURS
Refills: 0 | Status: DISCONTINUED | OUTPATIENT
Start: 2020-11-01 | End: 2020-11-02

## 2020-11-01 RX ORDER — OXYCODONE HYDROCHLORIDE 5 MG/1
5 TABLET ORAL EVERY 4 HOURS
Refills: 0 | Status: DISCONTINUED | OUTPATIENT
Start: 2020-11-01 | End: 2020-11-02

## 2020-11-01 RX ORDER — KETOROLAC TROMETHAMINE 30 MG/ML
30 SYRINGE (ML) INJECTION ONCE
Refills: 0 | Status: DISCONTINUED | OUTPATIENT
Start: 2020-11-01 | End: 2020-11-01

## 2020-11-01 RX ORDER — PANTOPRAZOLE SODIUM 20 MG/1
40 TABLET, DELAYED RELEASE ORAL DAILY
Refills: 0 | Status: DISCONTINUED | OUTPATIENT
Start: 2020-11-01 | End: 2020-11-02

## 2020-11-01 RX ADMIN — Medication 1000 MILLIGRAM(S): at 12:23

## 2020-11-01 RX ADMIN — HYDROMORPHONE HYDROCHLORIDE 0.5 MILLIGRAM(S): 2 INJECTION INTRAMUSCULAR; INTRAVENOUS; SUBCUTANEOUS at 10:09

## 2020-11-01 RX ADMIN — Medication 15 MILLIGRAM(S): at 18:33

## 2020-11-01 RX ADMIN — Medication 30 MILLIGRAM(S): at 13:20

## 2020-11-01 RX ADMIN — Medication 1000 MILLIGRAM(S): at 13:20

## 2020-11-01 RX ADMIN — OXYCODONE HYDROCHLORIDE 5 MILLIGRAM(S): 5 TABLET ORAL at 08:21

## 2020-11-01 RX ADMIN — SODIUM CHLORIDE 75 MILLILITER(S): 9 INJECTION, SOLUTION INTRAVENOUS at 07:16

## 2020-11-01 RX ADMIN — PANTOPRAZOLE SODIUM 40 MILLIGRAM(S): 20 TABLET, DELAYED RELEASE ORAL at 12:23

## 2020-11-01 RX ADMIN — Medication 30 MILLIGRAM(S): at 12:12

## 2020-11-01 RX ADMIN — OXYCODONE HYDROCHLORIDE 5 MILLIGRAM(S): 5 TABLET ORAL at 07:21

## 2020-11-01 RX ADMIN — Medication 1000 MILLIGRAM(S): at 18:33

## 2020-11-01 RX ADMIN — ENOXAPARIN SODIUM 40 MILLIGRAM(S): 100 INJECTION SUBCUTANEOUS at 12:12

## 2020-11-01 RX ADMIN — HYDROMORPHONE HYDROCHLORIDE 0.5 MILLIGRAM(S): 2 INJECTION INTRAMUSCULAR; INTRAVENOUS; SUBCUTANEOUS at 10:24

## 2020-11-01 RX ADMIN — Medication 15 MILLIGRAM(S): at 17:49

## 2020-11-01 RX ADMIN — SENNA PLUS 2 TABLET(S): 8.6 TABLET ORAL at 23:00

## 2020-11-01 RX ADMIN — Medication 1000 MILLIGRAM(S): at 17:49

## 2020-11-01 NOTE — DISCHARGE NOTE PROVIDER - NSDCACTIVITY_GEN_ALL_CORE
Do not make important decisions/Walking - Outdoors allowed/Do not drive or operate machinery/No heavy lifting/straining/Stairs allowed/Walking - Indoors allowed No heavy lifting/straining/Stairs allowed/Walking - Indoors allowed/Walking - Outdoors allowed/Showering allowed/Do not drive or operate machinery/Do not make important decisions

## 2020-11-01 NOTE — DISCHARGE NOTE PROVIDER - NSDCFUADDINST_GEN_ALL_CORE_FT
Keep steri-strips clean, dry and intact x 24 hrs. Apply water proof ice pack 20 mins on, 20 mins off to help decrease pain and swelling. After 24 hrs, you may begin showering as usual but DO NOT remove steri-strips. DO NOT scrub or soak incision site. Pat dry. NO tub baths, NO swimming pools, NO hot tubs x 2 weeks. NO exercise, strenuous activity, heavy lifting >10lbs until otherwise advised by surgeon. Nothing in the vagina x 2 weeks: No sex, tampons, douching. REST and relax!   Apply ice packs 30 mins on, 30 mins off every hour while awake for next 48 hours.  May take Tylenol for minor pain.  May take Aleve for moderate pain.  A script has been sent to your pharmacy for breakthrough pain medication.  To Take an over the counter stool softener like COLACE twice daily to prevent constipation.  Nothing in the vagina x 2 weeks: No sex, tampons, douching.   May shower once home.  No bathing until seen in office.  Please call for ANY questions or interval concerns.

## 2020-11-01 NOTE — DIETITIAN INITIAL EVALUATION ADULT. - OTHER INFO
As per chart pt is a 35 year old female with a PMH of neoplasm of uncertain behavior of small intestine, polyp of corpus uteri, ovarian cysts, Prothrombin gene mutation. S/P (10/30)  hysteroscopic polypectomy of uterus and Small bowel resection with anastomosis for small bowel mass. As per chart pt is a 35 year old female with a PMH of neoplasm of uncertain behavior of small intestine, polyp of corpus uteri, ovarian cysts, Prothrombin gene mutation. S/P (10/30)  hysteroscopic polypectomy of uterus and Small bowel resection with anastomosis for small bowel mass.    Pt seen at bedside. Pt recently advanced to full liquid diet, was previously on clear liquids and reports that she was tolerating it well. Pt with admission weight of 126.2lbs. Pt reports current weight of 123-124lbs, reports UBW of 128-130lbs, states that she has intentionally lost the weight over the past 2 years through diet modification and exercise. Pt made aware at that she is a healthy weight and additional weight loss is not necessary. Denies any chewing/ swallowing difficulties, denies any nausea/ vomiting/ diarrhea, reports constipation last BM 10/30.     No pressure injuries noted at this time

## 2020-11-01 NOTE — DISCHARGE NOTE PROVIDER - NSDCFUADDAPPT_GEN_ALL_CORE_FT
Follow up with Dr. Us in his office in ~2 weeks. Please call the office for an appointment if needed.    Follow up with Dr. Zamudio in his office in ~1 week. Please call the office for an appointment if needed. Follow up with Dr. Us in his office in ~2 weeks. Please call the office for an appointment if needed.    Follow up with Dr. Zamudio in his office in ~1 week. Please call the office for an appointment if needed.    Follow up with Dr. Maher in his office in ~ 1 week.  Please call the office for appointment.

## 2020-11-01 NOTE — DISCHARGE NOTE PROVIDER - NSDCMRMEDTOKEN_GEN_ALL_CORE_FT
Multiple Vitamins oral tablet: 1 tab(s) orally once a day  mupirocin 2% topical ointment: Apply to each nostril twice daily for 5 days  Valtrex 1 g oral tablet: 1 tab(s) orally once a day  vitamin A: 1250 microgram(s) orally once a day  Vitamin B Complex 100: 125 milligram(s) orally once a day  Vitamin C 1000 mg oral tablet: 1 tab(s) orally once a day  Vitamin D3 2000 intl units (50 mcg) oral capsule: 1 cap(s) orally once a day  vitamin E 180 mg oral capsule: 1 cap(s) orally once a day  Zinc 140 mg (as elemental zinc 50 mg) oral tablet: 1 tab(s) orally once a day   enoxaparin 300 mg/3 mL injectable solution: 40 milligram(s) subcutaneously once a day MDD:1  Multiple Vitamins oral tablet: 1 tab(s) orally once a day  mupirocin 2% topical ointment: Apply to each nostril twice daily for 5 days  Valtrex 1 g oral tablet: 1 tab(s) orally once a day  vitamin A: 1250 microgram(s) orally once a day  Vitamin B Complex 100: 125 milligram(s) orally once a day  Vitamin C 1000 mg oral tablet: 1 tab(s) orally once a day  Vitamin D3 2000 intl units (50 mcg) oral capsule: 1 cap(s) orally once a day  vitamin E 180 mg oral capsule: 1 cap(s) orally once a day  Zinc 140 mg (as elemental zinc 50 mg) oral tablet: 1 tab(s) orally once a day   Lovenox 40 mg/0.4 mL injectable solution: 1 dose(s) subcutaneously once a day MDD:1  Multiple Vitamins oral tablet: 1 tab(s) orally once a day  mupirocin 2% topical ointment: Apply to each nostril twice daily for 5 days  Valtrex 1 g oral tablet: 1 tab(s) orally once a day  vitamin A: 1250 microgram(s) orally once a day  Vitamin B Complex 100: 125 milligram(s) orally once a day  Vitamin C 1000 mg oral tablet: 1 tab(s) orally once a day  Vitamin D3 2000 intl units (50 mcg) oral capsule: 1 cap(s) orally once a day  vitamin E 180 mg oral capsule: 1 cap(s) orally once a day  Zinc 140 mg (as elemental zinc 50 mg) oral tablet: 1 tab(s) orally once a day   acetaminophen 500 mg oral tablet: 2 tab(s) orally every 6 hours  Lovenox 40 mg/0.4 mL injectable solution: 1 dose(s) subcutaneously once a day MDD:1  Multiple Vitamins oral tablet: 1 tab(s) orally once a day  Valtrex 1 g oral tablet: 1 tab(s) orally once a day  vitamin A: 1250 microgram(s) orally once a day  Vitamin B Complex 100: 125 milligram(s) orally once a day  Vitamin C 1000 mg oral tablet: 1 tab(s) orally once a day  Vitamin D3 2000 intl units (50 mcg) oral capsule: 1 cap(s) orally once a day  vitamin E 180 mg oral capsule: 1 cap(s) orally once a day  Zinc 140 mg (as elemental zinc 50 mg) oral tablet: 1 tab(s) orally once a day   acetaminophen 500 mg oral tablet: 2 tab(s) orally every 6 hours  Lovenox 40 mg/0.4 mL injectable solution: 1 dose(s) subcutaneously once a day MDD:1  Multiple Vitamins oral tablet: 1 tab(s) orally once a day  oxyCODONE 5 mg oral tablet: 1 tab(s) orally every 4 hours, As needed, Severe Pain (7 - 10) MDD:6  Valtrex 1 g oral tablet: 1 tab(s) orally once a day  vitamin A: 1250 microgram(s) orally once a day  Vitamin B Complex 100: 125 milligram(s) orally once a day  Vitamin C 1000 mg oral tablet: 1 tab(s) orally once a day  Vitamin D3 2000 intl units (50 mcg) oral capsule: 1 cap(s) orally once a day  vitamin E 180 mg oral capsule: 1 cap(s) orally once a day  Zinc 140 mg (as elemental zinc 50 mg) oral tablet: 1 tab(s) orally once a day

## 2020-11-01 NOTE — DISCHARGE NOTE PROVIDER - CARE PROVIDER_API CALL
Kapil Us)  Obstetrics and Gynecology  [address], [address]  [place] [address]  Phone: [phone]  Fax: [phone]  Follow Up Time:     Kavon Zamudio  SURGERY  [address] [place] [id number]  Phone: [phone]  Fax: [phone]  Follow Up Time:

## 2020-11-01 NOTE — PROGRESS NOTE ADULT - SUBJECTIVE AND OBJECTIVE BOX
STATUS POST:  SBR, hysteroscopic polypectomy     POST OPERATIVE DAY #: 2    SUBJECTIVE:  Patient seen and examined at bedside.  No overnight events.  Patient with no new complaints at this time, tolerating clear diet, admits to flatus and denies bowel movement.  Patient denies any fevers, chills, chest pain, shortness of breath, nausea, vomiting or diarrhea.    Vital Signs Last 24 Hrs  T(C): 36.8 (01 Nov 2020 04:48), Max: 37.4 (31 Oct 2020 16:19)  T(F): 98.2 (01 Nov 2020 04:48), Max: 99.4 (31 Oct 2020 23:38)  HR: 56 (01 Nov 2020 04:48) (56 - 70)  BP: 101/63 (01 Nov 2020 04:48) (91/52 - 108/66)  BP(mean): --  RR: 17 (01 Nov 2020 04:48) (17 - 18)  SpO2: 95% (01 Nov 2020 04:48) (95% - 99%)    PHYSICAL EXAM:  GENERAL: No acute distress, well-developed  HEAD:  Atraumatic, Normocephalic  ABDOMEN: Soft, diffuse, appropriate tenderness, minimally-distended, tympanic; bowel sounds+  NEUROLOGY: A&O x 3, no focal deficits    I&O's Summary    31 Oct 2020 08:01  -  01 Nov 2020 07:00  --------------------------------------------------------  IN: 825 mL / OUT: 800 mL / NET: 25 mL      I&O's Detail    31 Oct 2020 08:01  -  01 Nov 2020 07:00  --------------------------------------------------------  IN:    Lactated Ringers: 825 mL  Total IN: 825 mL    OUT:    Voided (mL): 800 mL  Total OUT: 800 mL    Total NET: 25 mL        MEDICATIONS  (STANDING):  enoxaparin Injectable 40 milliGRAM(s) SubCutaneous daily  lactated ringers. 1000 milliLiter(s) (75 mL/Hr) IV Continuous <Continuous>  pantoprazole  Injectable 40 milliGRAM(s) IV Push daily  senna 2 Tablet(s) Oral at bedtime    MEDICATIONS  (PRN):  acetaminophen   Tablet .. 650 milliGRAM(s) Oral every 6 hours PRN Temp greater or equal to 38C (100.4F), Mild Pain (1 - 3)  HYDROmorphone  Injectable 0.5 milliGRAM(s) IV Push every 4 hours PRN Severe Pain (7 - 10)  ketorolac   Injectable 15 milliGRAM(s) IV Push every 6 hours PRN Severe Pain (7 - 10)  oxyCODONE    IR 5 milliGRAM(s) Oral every 4 hours PRN Moderate Pain (4 - 6)    LABS:                        12.0   8.57  )-----------( 176      ( 31 Oct 2020 05:48 )             34.8     10-31    142  |  110<H>  |  8   ----------------------------<  93  4.5   |  28  |  1.10    Ca    8.5      31 Oct 2020 05:48  Mg     2.1     10-31    RADIOLOGY & ADDITIONAL STUDIES:    ASSESSMENT    35y Female POD 2 s/p SBR, hysteroscopic polypectomy with IUD placement currently doing well tolerating CLD with some abdominal pain.    PLAN  - Will discuss with Dr. Mitchell  - adv to FLD, poss REG in PM  - incentive spirometer  - pain control  - OOB  - serial abdominal exams  - labs in am    Surgical Team Contact Information  Spectralink: Ext: [phone] or [phone]  Pager: [phone]

## 2020-11-01 NOTE — DIETITIAN INITIAL EVALUATION ADULT. - ADD RECOMMEND
1) When medically feasible recommend to advance diet to Regular, 2) Pt encouraged adequate PO intake, 3) Monitor pt's PO intake, weight, skin, edema, GI distress

## 2020-11-01 NOTE — DIETITIAN INITIAL EVALUATION ADULT. - ORAL INTAKE PTA/DIET HISTORY
Pt reports good appetite and PO intake PTA. Denies follow any specific dietary restrictions but states that she tries to eat "clean and healthy", also admits to having sweets occasionally.  Supplement use PTA includes Vitamin A, C, D, and E; zinc, MVI, probiotic, apple cider vinegar. 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

## 2020-11-01 NOTE — DISCHARGE NOTE PROVIDER - NSDCCPCAREPLAN_GEN_ALL_CORE_FT
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

## 2020-11-01 NOTE — DISCHARGE NOTE PROVIDER - INSTRUCTIONS
Regular diet as tolerated. Regular diet, but please minimize raw fruits, raw vegetable and whole grain bread products until seen by Dr. Zamudio in office (low fiber/ low residue diet).

## 2020-11-01 NOTE — DISCHARGE NOTE PROVIDER - HOSPITAL COURSE
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  Pt notes  she was seen in [facility] for "severe stomach pain" - pt notes CT scan showed mass in small intestine and something in uterus told her she needed to be seen." Pt noets she saw both GI and GYN - workups thru GYN DX Uterine Polyp and GI DX mass size of gum ball on small intestine (Following Colonoscopy/Endoscopy/MRI). Pt was then seen by Dr Zamudio as per GI as well as Dr Us. Following discussions with both surgeons regarding procedures they have both been scheduled for same day and patient is electing for scheduled procedure. Pt also notes she is electing for placement of IUD as well during procedure.  (28 Oct 2020 09:25)    Patient underwent D&C hysteroscopy with polypectomy of uterus, IUD insertion and SBR with primary anastomosis without complication. She was transferred to the PACU and when PACU criteria was met, patient was transferred to the floor for observation. Postoperatively, patient's pain medication regimen was adjusted in order to properly manage pain. Early ambulation and incentive spirometry were encouraged. Diet was slowly advanced as tolerated as GI function returned. Lovenox given daily for DVT ppx. IV antibiotics given postoperatively for 2 doses. Labs trended daily. Patient cleared for discharge on POD ___.     DISPO: Follow up with Dr. Us and Dr. Zamudio as an outpatient. 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  Pt notes  she was seen in [place] [facility] for "severe stomach pain" - pt notes CT scan showed mass in small intestine and something in uterus told her she needed to be seen." Pt noets she saw both GI and GYN - workups thru GYN DX Uterine Polyp and GI DX mass size of gum ball on small intestine (Following Colonoscopy/Endoscopy/MRI). Pt was then seen by Dr Zamudio as per GI as well as Dr Us. Following discussions with both surgeons regarding procedures they have both been scheduled for same day and patient is electing for scheduled procedure. Pt also notes she is electing for placement of IUD as well during procedure.  (28 Oct 2020 09:25)    Patient underwent D&C hysteroscopy with polypectomy of uterus, IUD insertion and SBR with primary anastomosis without complication. She was transferred to the PACU and when PACU criteria was met, patient was transferred to the floor for observation. Postoperatively, patient's pain medication regimen was adjusted in order to properly manage pain. Early ambulation and incentive spirometry were encouraged. Diet was slowly advanced as tolerated as GI function returned. Lovenox given daily for DVT ppx. IV antibiotics given postoperatively for 2 doses. Labs trended daily. Patient cleared for discharge on POD 3.     DISPO: Follow up with Dr. Us and Dr. Zamudio as an outpatient for surgical care and Dr. Hon Maher for Hematology and Oncology follow-up.

## 2020-11-01 NOTE — DISCHARGE NOTE PROVIDER - NSDCCPTREATMENT_GEN_ALL_CORE_FT
PRINCIPAL PROCEDURE  Procedure: Laparoscopic enterectomy  Findings and Treatment:       SECONDARY PROCEDURE  Procedure: IUD insertion  Findings and Treatment:     Procedure: Hysteroscopic polypectomy of uterus  Findings and Treatment:

## 2020-11-02 ENCOUNTER — TRANSCRIPTION ENCOUNTER (OUTPATIENT)
Age: 35
End: 2020-11-02

## 2020-11-02 VITALS
HEART RATE: 59 BPM | RESPIRATION RATE: 18 BRPM | TEMPERATURE: 98 F | DIASTOLIC BLOOD PRESSURE: 61 MMHG | SYSTOLIC BLOOD PRESSURE: 98 MMHG | OXYGEN SATURATION: 97 %

## 2020-11-02 PROCEDURE — 88305 TISSUE EXAM BY PATHOLOGIST: CPT

## 2020-11-02 PROCEDURE — C1889: CPT

## 2020-11-02 PROCEDURE — 83735 ASSAY OF MAGNESIUM: CPT

## 2020-11-02 PROCEDURE — 80048 BASIC METABOLIC PNL TOTAL CA: CPT

## 2020-11-02 PROCEDURE — 88307 TISSUE EXAM BY PATHOLOGIST: CPT

## 2020-11-02 PROCEDURE — 36415 COLL VENOUS BLD VENIPUNCTURE: CPT

## 2020-11-02 PROCEDURE — 85025 COMPLETE CBC W/AUTO DIFF WBC: CPT

## 2020-11-02 PROCEDURE — 82962 GLUCOSE BLOOD TEST: CPT

## 2020-11-02 RX ORDER — ENOXAPARIN SODIUM 100 MG/ML
40 INJECTION SUBCUTANEOUS
Qty: 280 | Refills: 0
Start: 2020-11-02 | End: 2020-11-08

## 2020-11-02 RX ORDER — ENOXAPARIN SODIUM 100 MG/ML
1 INJECTION SUBCUTANEOUS
Qty: 7 | Refills: 0
Start: 2020-11-02 | End: 2020-11-08

## 2020-11-02 RX ORDER — ACETAMINOPHEN 500 MG
2 TABLET ORAL
Qty: 0 | Refills: 0 | DISCHARGE
Start: 2020-11-02

## 2020-11-02 RX ORDER — OXYCODONE HYDROCHLORIDE 5 MG/1
1 TABLET ORAL
Qty: 20 | Refills: 0
Start: 2020-11-02

## 2020-11-02 RX ADMIN — Medication 1000 MILLIGRAM(S): at 12:40

## 2020-11-02 RX ADMIN — Medication 15 MILLIGRAM(S): at 12:38

## 2020-11-02 RX ADMIN — ENOXAPARIN SODIUM 40 MILLIGRAM(S): 100 INJECTION SUBCUTANEOUS at 12:36

## 2020-11-02 RX ADMIN — PANTOPRAZOLE SODIUM 40 MILLIGRAM(S): 20 TABLET, DELAYED RELEASE ORAL at 12:42

## 2020-11-02 NOTE — PROGRESS NOTE ADULT - ASSESSMENT
[ASSESSMENT and  PLAN]  36yo F with uterine polyp s/p hysteroscopic resection, also found to have small bowel mass s/p resection; Prior to being started on OCPs (i.e. 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 This gene places her in the moderate to high Venous-thromboembolism risk group    - agree with 7-10 days of post-operative Lovenox to reduce risk of thrombosis  - with respect to airline travel, advised patient to wait 4-6 weeks post surgery  - additional therapy regarding adjuvant treatment for abdominal lesion pending final pathology results  - patient to follow-up in office upon discharge  - case discussed with Dr. Zamudio (surgery)
All as above in PA notation.  Patient personally seen and examined.  Tolerating advancement of diet.  Flatus and BM's both produced today.  Reporting appropriate and tolerable incisional pain.    Vital signs non-suggestive.  Appears well and NAD.  Chest with equal expansion and pulse regular.  Abdomen with clean and dry and intact Steri Strips.  Otherwise, soft with appropriate incisional tenderness.    No new labs today.  No new imaging since admission.  Awaiting Pathology results.  Clinically improving.  Surgically stable.  Discharge to home.  Instructions reviewed personally in detail...  Encouraged to call with questions or concerns... otherwise, General Surgery and Heme-Onc follow-up next week and to follow-up with Gyn at her convenience.

## 2020-11-02 NOTE — DISCHARGE NOTE NURSING/CASE MANAGEMENT/SOCIAL WORK - NSDCFUADDAPPT_GEN_ALL_CORE_FT
Follow up with Dr. Us in his office in ~2 weeks. Please call the office for an appointment if needed.    Follow up with Dr. Zamudio in his office in ~1 week. Please call the office for an appointment if needed.    Follow up with Dr. Maher in his office in ~ 1 week.  Please call the office for appointment.

## 2020-11-02 NOTE — DISCHARGE NOTE NURSING/CASE MANAGEMENT/SOCIAL WORK - PATIENT PORTAL LINK FT
You can access the FollowMyHealth Patient Portal offered by [facility] by registering at the following website: [email or web address]. By joining [employer]’s FollowMyHealth portal, you will also be able to view your health information using other applications (apps) compatible with our system.

## 2020-11-02 NOTE — PROGRESS NOTE ADULT - SUBJECTIVE AND OBJECTIVE BOX
STATUS POST:  SBR, hysteroscopic polypectomy     POST OPERATIVE DAY #: 3    SUBJECTIVE:  Patient seen and examined at bedside.  No overnight events.  Patient with no new complaints at this time, tolerating full liquid diet, admits to flatus and denies bowel movement.  Patient denies any fevers, chills, chest pain, shortness of breath, nausea, vomiting or diarrhea.    Vital Signs Last 24 Hrs  T(C): 36.9 (02 Nov 2020 04:57), Max: 37.3 (01 Nov 2020 12:04)  T(F): 98.4 (02 Nov 2020 04:57), Max: 99.2 (01 Nov 2020 12:04)  HR: 63 (02 Nov 2020 04:57) (58 - 68)  BP: 106/64 (02 Nov 2020 04:57) (104/63 - 114/64)  BP(mean): --  RR: 18 (02 Nov 2020 04:57) (17 - 20)  SpO2: 97% (02 Nov 2020 04:57) (96% - 98%)    PHYSICAL EXAM:  GENERAL: No acute distress, well-developed  HEAD:  Atraumatic, Normocephalic  ABDOMEN: Soft, moderately-tender (appropriate) , non-distended; bowel sounds+  NEUROLOGY: A&O x 3, no focal deficits    I&O's Summary    31 Oct 2020 08:01  -  01 Nov 2020 07:00  --------------------------------------------------------  IN: 825 mL / OUT: 800 mL / NET: 25 mL      I&O's Detail    31 Oct 2020 08:01  -  01 Nov 2020 07:00  --------------------------------------------------------  IN:    Lactated Ringers: 825 mL  Total IN: 825 mL    OUT:    Voided (mL): 800 mL  Total OUT: 800 mL    Total NET: 25 mL        MEDICATIONS  (STANDING):  acetaminophen   Tablet .. 1000 milliGRAM(s) Oral every 6 hours  enoxaparin Injectable 40 milliGRAM(s) SubCutaneous daily  ketorolac   Injectable 15 milliGRAM(s) IV Push every 6 hours  pantoprazole   Suspension 40 milliGRAM(s) Oral daily  senna 2 Tablet(s) Oral at bedtime    MEDICATIONS  (PRN):  HYDROmorphone  Injectable 0.5 milliGRAM(s) IV Push every 4 hours PRN Severe Pain (7 - 10)  oxyCODONE    IR 5 milliGRAM(s) Oral every 4 hours PRN Severe Pain (7 - 10)    LABS:  pending    RADIOLOGY & ADDITIONAL STUDIES:  no new    ASSESSMENT    35y Female POD 3 s/p SBR, hysteroscopic polypectomy with IUD placement currently doing well tolerating FLD with some abdominal pain.    PLAN  - Will discuss with Dr. Zamudio  - adv to REG, f/u tolerance  - hopeful d/c in PM if pt has a BM.  - incentive spirometer  - pain control  - OOB  - serial abdominal exams    Surgical Team Contact Information  Spectralink: Ext: [phone] or [phone]  Pager: [phone] STATUS POST:  SBR, hysteroscopic polypectomy       POST OPERATIVE DAY #: 3      SUBJECTIVE:  Patient seen and examined at bedside.  No overnight events.  Patient with no new complaints at this time, tolerating full liquid diet, admits to flatus and denies bowel movement.  Patient denies any fevers, chills, chest pain, shortness of breath, nausea, vomiting or diarrhea.      Vital Signs Last 24 Hrs  T(F): 98.4 (02 Nov 2020 04:57), Max: 99.2 (01 Nov 2020 12:04)  HR: 63 (02 Nov 2020 04:57) (58 - 68)  BP: 106/64 (02 Nov 2020 04:57) (104/63 - 114/64)  RR: 18 (02 Nov 2020 04:57) (17 - 20)  SpO2: 97% (02 Nov 2020 04:57) (96% - 98%)      PHYSICAL EXAM:  GENERAL: No acute distress, well-developed  HEAD:  Atraumatic, Normocephalic  ABDOMEN: Soft, moderately-tender (appropriate) , non-distended; bowel sounds+  NEUROLOGY: A&O x 3, no focal deficits      I&O's Summary    31 Oct 2020 08:01  -  01 Nov 2020 07:00  --------------------------------------------------------  IN: 825 mL / OUT: 800 mL / NET: 25 mL      I&O's Detail    31 Oct 2020 08:01  -  01 Nov 2020 07:00  --------------------------------------------------------  IN:    Lactated Ringers: 825 mL  Total IN: 825 mL    OUT:    Voided (mL): 800 mL  Total OUT: 800 mL    Total NET: 25 mL      MEDICATIONS  (STANDING):  acetaminophen   Tablet .. 1000 milliGRAM(s) Oral every 6 hours  enoxaparin Injectable 40 milliGRAM(s) SubCutaneous daily  ketorolac   Injectable 15 milliGRAM(s) IV Push every 6 hours  pantoprazole   Suspension 40 milliGRAM(s) Oral daily  senna 2 Tablet(s) Oral at bedtime      MEDICATIONS  (PRN):  HYDROmorphone  Injectable 0.5 milliGRAM(s) IV Push every 4 hours PRN Severe Pain (7 - 10)  oxyCODONE    IR 5 milliGRAM(s) Oral every 4 hours PRN Severe Pain (7 - 10)      LABS:  pending    RADIOLOGY & ADDITIONAL STUDIES:  no new      ASSESSMENT    35y Female POD 3 s/p SBR, hysteroscopic polypectomy with IUD placement currently doing well tolerating FLD with some abdominal pain.      PLAN  - Will discuss with Dr. Zamudio  - adv to REG, f/u tolerance  - hopeful d/c in PM if pt has a BM.  - incentive spirometer  - pain control  - OOB  - serial abdominal exams      Surgical Team Contact Information  Spectralink: Ext: [phone] or [phone]  Pager: [phone]

## 2020-11-02 NOTE — PROGRESS NOTE ADULT - SUBJECTIVE AND OBJECTIVE BOX
Patient seen and examined;  Chart reviewed and events noted;   having some pain post operatively    MEDICATIONS  (STANDING):  acetaminophen   Tablet .. 1000 milliGRAM(s) Oral every 6 hours  enoxaparin Injectable 40 milliGRAM(s) SubCutaneous daily  ketorolac   Injectable 15 milliGRAM(s) IV Push every 6 hours  pantoprazole   Suspension 40 milliGRAM(s) Oral daily  senna 2 Tablet(s) Oral at bedtime    MEDICATIONS  (PRN):  HYDROmorphone  Injectable 0.5 milliGRAM(s) IV Push every 4 hours PRN Severe Pain (7 - 10)  oxyCODONE    IR 5 milliGRAM(s) Oral every 4 hours PRN Severe Pain (7 - 10)      Vital Signs Last 24 Hrs  T(C): 36.9 (02 Nov 2020 04:57), Max: 36.9 (02 Nov 2020 04:57)  T(F): 98.4 (02 Nov 2020 04:57), Max: 98.4 (02 Nov 2020 04:57)  HR: 63 (02 Nov 2020 04:57) (63 - 68)  BP: 106/64 (02 Nov 2020 04:57) (104/63 - 106/65)  RR: 18 (02 Nov 2020 04:57) (17 - 18)  SpO2: 97% (02 Nov 2020 04:57) (96% - 98%)    PHYSICAL EXAM  General: adult in NAD  HEENT: clear oropharynx, anicteric sclera, pink conjunctivae  Neck: supple  CV: normal S1S2 with no murmur rubs or gallops  Lungs: clear to auscultation, no wheezes, no rhales  Abdomen: + pain with palpation  Ext: no clubbing cyanosis or edema  Skin: no rashes and no petichiae  Neuro: alert and oriented X3 no focal deficits      No new labs; No new pathology

## 2020-11-03 LAB — SURGICAL PATHOLOGY STUDY: SIGNIFICANT CHANGE UP

## 2020-11-10 ENCOUNTER — APPOINTMENT (OUTPATIENT)
Dept: SURGERY | Facility: CLINIC | Age: 35
End: 2020-11-10
Payer: COMMERCIAL

## 2020-11-10 DIAGNOSIS — K63.9 DISEASE OF INTESTINE, UNSPECIFIED: ICD-10-CM

## 2020-11-10 DIAGNOSIS — Z98.890 OTHER SPECIFIED POSTPROCEDURAL STATES: ICD-10-CM

## 2020-11-10 PROCEDURE — 99024 POSTOP FOLLOW-UP VISIT: CPT

## 2020-11-10 RX ORDER — DICYCLOMINE HYDROCHLORIDE 20 MG/1
20 TABLET ORAL
Qty: 120 | Refills: 0 | Status: ACTIVE | COMMUNITY
Start: 2020-09-01

## 2020-11-10 RX ORDER — TINIDAZOLE 500 MG/1
500 TABLET, FILM COATED ORAL
Qty: 8 | Refills: 0 | Status: ACTIVE | COMMUNITY
Start: 2020-07-13

## 2020-11-10 RX ORDER — ASPIRIN 81 MG
81 TABLET, DELAYED RELEASE (ENTERIC COATED) ORAL
Refills: 0 | Status: ACTIVE | COMMUNITY

## 2020-11-10 RX ORDER — DOXYCYCLINE 100 MG/1
100 TABLET, FILM COATED ORAL
Qty: 14 | Refills: 0 | Status: ACTIVE | COMMUNITY
Start: 2020-07-13

## 2020-11-10 RX ORDER — SODIUM PICOSULFATE, MAGNESIUM OXIDE, AND ANHYDROUS CITRIC ACID 10; 3.5; 12 MG/160ML; G/160ML; G/160ML
10-3.5-12 MG-GM LIQUID ORAL
Qty: 320 | Refills: 0 | Status: ACTIVE | COMMUNITY
Start: 2020-09-01

## 2020-11-10 RX ORDER — METRONIDAZOLE 500 MG/1
500 TABLET ORAL
Qty: 30 | Refills: 0 | Status: ACTIVE | COMMUNITY
Start: 2020-07-13

## 2020-11-10 RX ORDER — ONDANSETRON 4 MG/1
4 TABLET, ORALLY DISINTEGRATING ORAL
Qty: 15 | Refills: 0 | Status: ACTIVE | COMMUNITY
Start: 2020-08-31

## 2020-11-10 RX ORDER — CLINDAMYCIN PHOSPHATE 20 MG/G
2 CREAM VAGINAL
Qty: 40 | Refills: 0 | Status: ACTIVE | COMMUNITY
Start: 2020-08-14

## 2020-11-10 RX ORDER — MUPIROCIN 20 MG/G
2 OINTMENT TOPICAL
Qty: 22 | Refills: 0 | Status: ACTIVE | COMMUNITY
Start: 2020-10-29

## 2020-11-10 RX ORDER — FLUCONAZOLE 150 MG/1
150 TABLET ORAL
Qty: 1 | Refills: 0 | Status: ACTIVE | COMMUNITY
Start: 2020-07-13

## 2020-11-10 RX ORDER — ENOXAPARIN SODIUM 100 MG/ML
40 INJECTION SUBCUTANEOUS
Qty: 3 | Refills: 0 | Status: ACTIVE | COMMUNITY
Start: 2020-11-02

## 2020-11-10 NOTE — PLAN
[FreeTextEntry1] : S/P uneventful general anesthesia with uncomplicated hysteroscopy and D & C.\par Clinically well by this history today.  Surgically stable by this examination.\par No evidence by history or examination to suggest post-operative complication.\par Cleared to resume casual activities with own comfort as guide.\par To refrain from maximal exertions for another month.\par Timing and technique of scar massage reviewed in detail.\par Pathology report and it's benign nature consistent reviewed in detail.\par She agrees to call this office and schedule Hematology -Oncology follow-up for January as recommended by Dr. SANTO Brush.\par As she has been cleared by Heme-Onc to travel, I have told Ms. Ro that she is surgically stable as well.  However, she must ambulate routinely, take her Aspirin daily and I have directed her to take Aspirin 81 mg X TWO until her travels are over and she sees Heme-Onc once again.  She agrees and verbalizes a willingness to call with questions or concerns or changes.  I remain available...\par Ms. Ro is pleased and agrees, leaving in good spirits and verbalizing plan with instructions as outlined above.

## 2020-11-10 NOTE — PHYSICAL EXAM
[Respiratory Effort] : normal respiratory effort [Normal Rate and Rhythm] : normal rate and rhythm [No HSM] : no hepatosplenomegaly [Tender] : was nontender [Enlarged] : not enlarged [No Rash or Lesion] : No rash or lesion [Alert] : alert [Oriented to Person] : oriented to person [Oriented to Place] : oriented to place [Oriented to Time] : oriented to time [Calm] : calm [de-identified] : Appears well and in no acute distress, ambulates easily into office and assumes examination table without need of assistance from me. [de-identified] : Remains normocephalic and atraumatic. [de-identified] : Still supple with full range of motion. [FreeTextEntry1] : No cervical, supraclavicular, axillary or inguinal adenopathy on this exam. [de-identified] : Deferred. [de-identified] : Flat to scaphoid, as per baseline.\par Soft, non-tense and appropriate/ minimal incisional tenderness.\par No visible masses or palpable collections.\par Small, soft, rubbery cyst or lipoma in SQ of RUQ/ epigastrium stable.\par Wounds clean and dry and intact.\par No expressible drainage.\par No appreciable odor.\par No overlying or surrounding inflammatory changes.\par No SQ collections to suggest hematoma or seroma or abscess.\par No fascial defects to suggest hernia.\par Mild post-operative edema and ecchymoses, but both well within normal limits/ expectations.\par No expressible drainage [de-identified] : Normal external genitalia. [de-identified] : Deferred. [de-identified] : Grossly symmetric, within normal limits without motor or sensory deficits.   Bilateral lower extremities are symmetric. [de-identified] : pleased with her overall progress, anxious about the "future... but mainly my scars!"

## 2020-11-10 NOTE — DATA REVIEWED
[FreeTextEntry1] : SIDNEY FLORES                      3\par Addendum Report  Addendum\par 2.  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  Immunohistochemical stains performed and interpreted at [facility] supports the above diagnosis.  The rest of the diagnosis remains unchanged.\par See attached report from [facility] signed by Angy López M.D. on 11/05/20\par Verified by: Zaida Moss M.D.  (Electronic Signature)\par Reported on: 11/05/20 14:49 EST, [facility], [address] [place] [address]\par Phone: [phone]   Fax: [phone]\par _________________________________________________________________

## 2020-11-10 NOTE — REVIEW OF SYSTEMS
[Feeling Poorly] : not feeling poorly [Feeling Tired] : not feeling tired [As Noted in HPI] : as noted in HPI [Abdominal Pain] : no abdominal pain [Vomiting] : no vomiting [Constipation] : no constipation [Diarrhea] : no diarrhea [Heartburn] : no heartburn [Melena] : no melena [Skin Lesions] : skin lesion [Anxiety] : no anxiety [Depression] : no depression [Negative] : Heme/Lymph [de-identified] : stable... [de-identified] : pleased with progress to date...

## 2020-11-10 NOTE — HISTORY OF PRESENT ILLNESS
[de-identified] : Very pleasant though understandably anxious lady arriving with her mother under the direction of their gastroenterologist.\par Ms. Ro reports a 2 to 3 month history of intermittent and low grade colicky or crampy type abdominal pain.\par She denies any associated systemic complaints and describes a poorly localized or migrating discomfort.\par Fortunately, she states that this pain has essentially resolved over the course of the last several weeks.\par Unfortunately, during the course of her extensive GI workup, a small bowel lesion and gynecologic Pathology were identified. [de-identified] : Ms. Ro arrives in very good spirits.\par She is pleased with her progress and believes that she has done well overall.\par She still reports some incisional discomfort.\par However, she denies karan abdominal pain.\par She has had no upper or lower GI complaints.\par She is in follow-up with her GYN.\par She has seen the Hematologist-Oncologist.\par Ms. Ro understands that her Pathology report is benign, but that also Genetic Counseling and further Oncology follow-up have been advised...

## 2020-11-11 PROBLEM — N84.0 POLYP OF CORPUS UTERI: Chronic | Status: ACTIVE | Noted: 2020-10-28

## 2020-11-11 PROBLEM — D68.52 PROTHROMBIN GENE MUTATION: Chronic | Status: ACTIVE | Noted: 2018-08-06

## 2020-11-11 PROBLEM — D37.2 NEOPLASM OF UNCERTAIN BEHAVIOR OF SMALL INTESTINE: Chronic | Status: ACTIVE | Noted: 2020-10-28

## 2021-01-12 ENCOUNTER — APPOINTMENT (OUTPATIENT)
Dept: SURGERY | Facility: CLINIC | Age: 36
End: 2021-01-12

## 2021-02-28 NOTE — ED ADULT NURSE NOTE - NURSING MUSC ROM
full range of motion in all extremities
Induction of labor-Medicinal/Augmentation of labor/External electronic FM/Meconium staining

## 2021-07-06 ENCOUNTER — NON-APPOINTMENT (OUTPATIENT)
Age: 36
End: 2021-07-06

## 2021-11-01 DIAGNOSIS — K76.9 LIVER DISEASE, UNSPECIFIED: ICD-10-CM

## 2021-12-06 ENCOUNTER — NON-APPOINTMENT (OUTPATIENT)
Age: 36
End: 2021-12-06

## 2022-01-24 NOTE — ED PROVIDER NOTE - CROS ED RESP ALL NEG
PA for myrbetriq 50 mg submitted on cover my meds.     Key: [id number]    Plan will make determination within 1-5 business days.   
negative...

## 2022-03-18 NOTE — ED PROVIDER NOTE - NS ED MD EM SELECTION
Continued Stay Note  MISA Joe     Patient Name: Gayathri Reddy  MRN: [id number]  Today's Date: 3/18/2022    Admit Date: 3/6/2022     Discharge Plan     Row Name 03/18/22 0802       Plan    Plan Home with [facility] (accepted and order in pace, Current home oxygen with [employer]    Plan Comments d/c barrier: renal following labs and holding lasix            Phone communication or documentation only - no physical contact with patient or family.        Vanessa Fraser RN     [id number] [facility]

## 2023-02-08 NOTE — H&P PST ADULT - NS PRO LACT YNNA
"Subjective:      Patient ID: Alyssa Sanchez is a 51 y.o. female.    Chief Complaint: Breast Pain (Right Breast)      HPI: (PF, EPF - 1-3) (Detailed, Comp, - 4) returning patient presents with c/o pain right side, onset "at least one year ago", "mostly its constant, sometimes its better with a hot bath and after taking muscle relaxer". Worse when wearing her bra. She reports multiple lumps in her breast, uncertain if they are new "I have multiple cysts". Denies redness of the skin, nipple discharge    Last mmg 8-5-2016, she did not have CBE at that time and did not return until today     Partial hysterectomy, no HRT     Review of Systems  Objective:   Physical Exam   Pulmonary/Chest: She exhibits tenderness. She exhibits no mass, no edema, no deformity, no swelling and no retraction. Right breast exhibits no inverted nipple, no mass, no nipple discharge, no skin change and no tenderness. Left breast exhibits no inverted nipple, no mass, no nipple discharge, no skin change and no tenderness. Breasts are symmetrical. There is no breast swelling.   No breast mass or pain identified. Her area of reported pain is posterior axillary line right lateral side, this is lateral to the breast and does not appear to involve the breast itself. Breathing non-labored.    Lymphadenopathy:     She has no cervical adenopathy.     She has no axillary adenopathy.        Right: No supraclavicular adenopathy present.        Left: No supraclavicular adenopathy present.     Assessment:       1. Breast pain        Plan:       Pain right lateral posterior chest, does not appear to be breast related. No breast abnormality noted. History of FCC, no new abnormality or changes reported on last mmg 8/2016.   She request to return to me for breast cancer screening, return in August with haley alberts mmg. She will f/u with her PCP regarding right lateral chest wall/back.   Call for any interval breast changes or concerns      "
Chief Complaint   Patient presents with   • Derm Problem     10 month follow up non melanoma cell carcinoma with waist up skin exam  10 month follow up actinic keratoses     • Office Visit       HISTORY OF PRESENT ILLNESS:     The patient is a 89 year old male who goes by the name Chris.    The patient presents in 10 month followup regarding history of nonmelanoma skin cancer in a patient with sun damaged skin.  The patient's last skin cancer was a basal cell carcinoma located on the left forearm.  It was treated with Mohs surgery by Dr. Alonso.  It was treated on the following date:  6/27/2022      When the patient was asked if there are any spots on the skin that the patient would like to point out today the patient responded:       they have no lesions of concern to point out today.      The patient uses the over the counter medication, sunscreen of at least SPF 30   Yes  The patient avoids the peak sun hours of 10 am to 4 pm     Yes  The patient wears sun-protective clothing       Yes      Additionally the patient presents in 10  month followup regarding history of actinic keratoses.  I last treated 15 actinic keratosis(es) on this patient with liquid nitrogen on 4/20/2022.      Waist up skin examination today      REVIEW OF SYSTEMS:  Cardiovascular:   The patient has a pacemaker:  No        The patient has a defibrillator:  No  Hematologic:   The patient bleeds easily because of being on aspirin or an anticoagulant:  Yes Aspirin 81 mg daily    ALLERGIES:   Allergen Reactions   • Gabapentin Other (See Comments)     Drowsiness and unsteadiness   • Niaspan [Niacin (Antihyperlipidemic)] Other (See Comments)     ER:  [place]         Current Outpatient Medications   Medication Sig   • furosemide (LASIX) 20 MG tablet Take 1 tablet by mouth as needed (swelling or shortness of breath).   • aspirin (ECOTRIN) 81 MG EC tablet Take 81 mg by mouth daily.   • dilTIAZem (CARDIZEM CD) 120 MG 24 hr capsule TAKE 1 CAPSULE BY 
MOUTH DAILY   • latanoprost (XALATAN) 0.005 % ophthalmic solution Instill 1 drop in both eyes at bedtime as directed.   • atorvastatin (LIPITOR) 20 MG tablet TAKE 1 TABLET BY MOUTH DAILY   • mirabegron ER (Myrbetriq) 25 MG 24 hour tablet Take 1 tablet by mouth daily.   • tamsulosin (FLOMAX) 0.4 MG Cap Take 2 capsules by mouth daily after a meal.   • carboxymethylcellulose-glycerin-polysorbate (REFRESH OPTIVE ADVANCED) 0.5-1-0.5 % ophthalmic solution Apply to eye as needed.   • Multiple Vitamins-Minerals (PRESERVISION AREDS 2 PO) Take by mouth 2 times daily.   • PHOSPHATIDYLSERINE PO Take by mouth daily.   • Cyanocobalamin (B-12) 500 MCG SL Tab Place 1 tablet (500 mcg) under the tongue daily. (Patient taking differently: Place 500 mcg under the tongue daily. Patient advises that he was instructed to take twice weekly, not daily. He states it is not a SL, but just a tablet he swallows)   • omeprazole (PRILOSEC) 20 MG capsule Take 20 mg by mouth daily.   • Zinc 50 MG CAPS Take 50 mg by mouth daily.      No current facility-administered medications for this visit.       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                       
                      BPH (benign prostatic hyperplasia)                            Esophageal reflux                                             Recurrent oral herpes simplex                                   Comment: episodic use of Valtrex    Cataract                                                      Uveitis                                                       Coronary arteriosclerosis                                     History of gallstones                                         Calculus of kidney and ureter                                 DJD (degenerative joint disease) of knee                      Migraine                                                        Comment: acephalgic migraine    Neuropathy of both feet                                       Primary open angle glaucoma (POAG) of right ey* 8/5/2020        DERMATOLOGY PAST MEDICAL HISTORY:       Squamous cell carcinoma right upper chest T 1 level excised by the general surgeon Dr. Gabriele Lazaro October 23, 2001.    Squamous cell carcinoma right anterior base of neck excised by the general surgeon Dr. Gabriele Lazaro December 1, 1999.    Squamous cell carcinoma right anterior shoulder excised in the past and complicated with secondary bacterial infection.    Basal cell carcinoma right lower eyelid margin excised by Dr. Lea.    Basal cell carcinoma left upper back T 1 level 12 cm from the spine treated with curettage and electrodesiccation August 23, 2013.    Squamous cell carcinoma, superficially invasive, right mid posterior ear located just posterior to the outer helix sulcus treated with curettage and electrodesiccation September 12, 2014.  This procedure was complicated by postoperative bleeding a few days later.  Basal cell carcinoma left postauricular scalp 10 mm posterior to the superior ear treated with Mohs surgery by Dr. Tadeo Cortes May 26, 2015.  Squamous cell carcinoma in situ - left superior helix treated with excision by 
Dr. Binh Olivera on 10/6/2017  Squamous cell carcinoma - posterior scalp treated with excision by Dr. Binh Olivera on10/6/2017   squamous cell carcinoma on the left lower arm excised by Dr. Rosen in [facility] on 1/24/2018.   squamous cell carcinoma on the right ear excised by Dr. [facility] in [place], [place] on 2/23/2018.  squamous cell carcinoma in situ - left posterior tricep treated with curettage and electrodesiccation by Dr. Rider -  5/11/2018.   squamous cell carcinoma in situ - right dorsal forearm treated with curettage and electrodesiccation by Dr. Rider - 4/29/2019  squamous cell carcinoma - left medial thigh treated with curettage and electrodesiccation by Dr. Rider - 8/15/2019  squamous cell carcinoma on the left forearm treated with excision by Dr. Olivera on 11/16/2020.  squamous cell carcinoma in situ on the right anterior shoulder treated with excision by Dr. Olivera on 9/3/2021  squamous cell carcinoma in situ on the right sternal notch treated with excision by Dr. Olivera on 9/3/2021  squamous cell carcinoma on the left upper chest treated with excision by Dr. Olivera on 9/3/2021     Actinic keratoses treated with liquid nitrogen   diffuse actinic keratoses on the face and scalp treated with 4.5  % 5 Fluorouracil cream - April 2019.   diffuse actinic keratoses on the scalp, ears and forehead  treated with 4.5  % 5 Fluorouracil cream - Middle of October 2019.      basal cell carcinoma on the left forearm treated with Mohs surgery by Dr. Alonso on 6/27/2022    Oral herpes simplex treated with Valtrex      Pigmented actinic keratosis left superior shoulder medially on shave removal April 27, 2011.      Destruction of inflamed seborrheic keratoses.    FAMILY HISTORY:  Does the patient have a family history of melanoma? No    PHYSICAL EXAMINATION:    Well healed scar left forearm, no signs of recurrence (History of basal cell carcinoma)    Actinic keratoses as follows:  Right temple 5 mm pink scaly 
papule  Right preauricular cheek 6 mm pink scaly papule  Left mid helix 4 mm pink scaly papule  Central forehead 1 cm pink scaly papule  Left neck 5 mm pink scaly papule  Left shoulder 8 mm pink scaly papule    Left clavicle 7 mm pink scaly papule (Squamous cell carcinoma)    Left tricep 1.5 cm pink scaly plaque (Eczema versus non melanoma skin cancer)    Sun damaged skin    On the back, chest and arms there are light brown even colored and symmetric macules (Nevi)      No other significant findings on WAIST UP EXAMINATION which is a DETAILED  EXAMINATION  including palpation and inspection of the scalp and hair and inspection of the head and face, eyelids, lips, neck, chest (including breasts and underarms-including eccrine and apocrine glands), abdomen, back and right and left upper extremities.  The patient is well nourished and well developed.    The patient was seen and examined by Aliya Rider MD.  in the presence of my medical assistant  Lindsey Gonzalez MA .  This office visit note was in part created by Lindsey Gonzalez MA acting also as a scribe for Aliya Rider MD.  Aliya Rider MD    reviewed the note for accuracy and completeness before signing.        ASSESSMENT AND PLAN:     1.  History of multiple non-melanoma skin cancers     We recommended using an over the counter medication, Sunscreen with SPF 30.      2.  Sun damaged skin (dermatoheliosis)    For management of this problem I recommend:  Avoiding the peak sun hours of 10 am to 4 pm  Wearing appropriate sun protection clothing.  We recommend the patient use the over the counter medication, Sunscreen with SPF 30.      3.  7 mm pink scaly papule on the left clavicle    After discussion of the risks and benefits of shave biopsy (TANGENTIAL BIOPSY), the patient gave oral consent for the same.  The area was cleansed with alcohol and the site was anesthetized with 2% lidocaine with 1:100,000 epinephrine.  The specimen was obtained with 
shave technique.  Minimal bleeding was stopped with Drysol. The patient was instructed in wound care in oral and written forms.  The specimen was submitted for an [facility] dermatopathologist to read.  Diagnosis is neoplasm of uncertain behavior skin.    Differential diagnosis is:   Squamous cell carcinoma    We took a photograph of this site to help verify the location and appearance of the lesion.  The patient gave verbal consent for me to take this photograph and enter this photograph into EPIC.      4.  1.5 cm pink scaly plaque on the left tricep    After discussion of the risks and benefits of shave biopsy (TANGENTIAL BIOPSY), the patient gave oral consent for the same.  The area was cleansed with alcohol and the site was anesthetized with 2% lidocaine with 1:100,000 epinephrine.  The specimen was obtained with shave technique.  Minimal bleeding was stopped with Drysol. The patient was instructed in wound care in oral and written forms.  The specimen was submitted for an [facility] dermatopathologist to read.  Diagnosis is neoplasm of uncertain behavior skin.    Differential diagnosis is:   Eczema versus non melanoma skin cancer    We took a photograph of this site to help verify the location and appearance of the lesion.  The patient gave verbal consent for me to take this photograph and enter this photograph into EPIC.      5. Actinic Keratoses x 6    The diagnosis was discussed.  I recommended destruction with liquid nitrogen and after a discussion of risks and benefits of liquid nitrogen treatment the patient gave oral consent for the same.  Therefore liquid nitrogen was used to destroy the lesion(s).    Return to clinic pending pathologies above .      6.  Nevi    Wearing appropriate sun protection clothing.  We recommend the patient use the over the counter medication, Sunscreen with SPF 30.          On 2/9/2023, Lindsey POSEY MA scribed the services personally performed by Aliya Rider MD  The 
documentation recorded by the scribe accurately and completely reflects the service(s) I personally performed and the decisions made by me.       
no

## 2023-03-14 NOTE — H&P PST ADULT - NSSUBSTANCEUSE_GEN_ALL_CORE_SD
Coughing and running nose x 1 week. Denies CP, SOB, fever. Father reports 4 siblings with same symptoms
Denies Illicit Drug Use/caffeine

## 2023-03-16 NOTE — ED PROVIDER NOTE - ATTESTATION, MLM
I have reviewed and confirmed nurses' notes for patient's medications, allergies, medical history, and surgical history.
PAST MEDICAL HISTORY:  Adrenal nodule found incidentally, monitored    Allergic Asthma induced by Cat dandur    Arthritis     Cellulitis     Cervical spondylosis     COVID-19 virus infection positive on 3/22/22    Diabetes mellitus     Esophageal reflux     Fatty liver     fracture Right tibia s/p surgery    H/O carpal tunnel syndrome     HLD (hyperlipidemia)     HTN (hypertension)     Labyrinthitis     Lumbar radiculopathy     Lung nodule rll, had cryptococcus infection, treated, s/p surgery    Nephrolithiasis left, monitored    Orbital fracture     Psoriasis     Sinusitis s/p

## 2024-09-19 NOTE — H&P PST ADULT - BP NONINVASIVE MEAN (MM HG)
74
Plan: Discussed obtaining a wig. Provided patient with information for [facility] [address]
Detail Level: Zone

## 2025-01-17 NOTE — ED ADULT NURSE NOTE - SUICIDE SCREENING DEPRESSION
[facility]  [address]  [place] [address]  Phone [phone]       /      Fax [phone]  SURGEON: Dr. Abad    After discharge, all questions or concerns should be handled at your surgeon's office ([phone]). If it is a weekend or after hours, you will get the surgeon on call.     Discharge Medications:    PAIN MANAGEMENT: Next Dose Available   Mobic/Meloxicam (Anti-Inflammatory) - ok to restart home regimen AM on 1/18/25   Robaxin/Methocarbamol 750mg (Muscle Relaxer) - Every 6-8 hours AS NEEDED for muscle spasms, thigh pain or additional pain control Anytime if needed   Percocet 5/325mg (Oxycodone/Acetaminophen) (Pain Med) - every 4-6 hours AS NEEDED for pain 9pm   COMPLICATION PREVENTION MEDS: Next Dose DUE   Aspirin 81mg twice a day for 6 weeks post-op for blood clot prevention PM on 1/17/25   Miralax 17gm or Senokot S/Marilu-Colace 8.6/50mg 2 tablets - once or twice a day while on narcotics and muscle relaxers for constipation prevention PM on 1/17/25   NOZIN NASAL  - twice a day for 2 weeks or until supply runs out, whichever comes first (Infection prevention) PM on 1/17/25   Antibiotic Regimen:  Next Dose Available                      Total Knee Replacement                                                                                                                                    PAIN MEDICATIONS/PAIN MANAGEMENT: (Use the medication log in your discharge packet to keep track of your medications)  Mobic/Meloxicam  (Anti-inflammatory) - Ok to restart home dose.  While on Mobic/Meloxicam and Aspirin (blood thinner) at the same time, take a medication once or twice a day to protect your stomach (for the next 10 days) (Examples: Nexium, Prilosec, Prevacid, Omeprazole, Pepcid...etc). If you start having intolerable stomach issues, discontinue the Mobic/Meloxicam completely.   Aside from Mobic, No other anti-inflammatories (Ibuprofen, Aleve, Motrin,  Naproxen, Toradol/Ketorolac, Celebrex, Diclofenac/Voltaren...etc) for 2 weeks or until the wound is healed.      Percocet 5/325mg (Oxycodone/Acetaminophen) (pain pill) - You can take 1 tablet every 4-6 hours for pain. If the pain is mild, take 1/2 of a pill. Once you start taking a half of a pain pill, you can take a Tylenol 325mg with each dose for a little extra pain relief without side effects. Gradually decrease the use as the pain lessens. As you decrease the Percocet, increase the Tylenol.  **NO MORE THAN 3000mg OF TYLENOL IN 24 HOURS**.     Robaxin/Methocarbamol 750mg (muscle relaxer)- you can take every 6-8 hours as needed for muscle spasms, thigh pain and stiffness, additional pain control or breakthrough pain medications. This medication is helpful for pain control while lessening your need for narcotics. Please reduce the use gradually as the pain and spasms lessen. DO NOT TAKE AT THE SAME TIME AS A PAIN PILL. YOU WILL BE BETTER SERVED WITH 2 HOURS BETWEEN PAIN PILL AND MUSCLE RELAXER.     **Other things that help with pain control is WALKING, COMPRESSION WRAP, ICE and ELEVATION!!**    BLOOD CLOT PREVENTION:   Aspirin 81mg (blood thinner) - twice a day for 6 weeks for blood clot prevention. Start on the evening of 1/17/25. Stop on 3/1/25.  If you were taking Baby Aspirin 81mg prior to surgery, may return to daily dose AFTER 6 weeks - 3/2/25.    You need to continuing wearing your compression stockings (TATIANNA Hose - ThromboEmbolic Disease Prevention Device) for the next 2-6 weeks post-op. It is ok to remove them for hygiene and at bedtime.   Hand wash and Dry. **If the swelling persists in the legs after you stop wearing the TATIANNA hose, continue to wear them until the swelling decreases.**  REMOVE STOCKINGS AT LEAST DAILY FOR SKIN ASSESSMENT.   Do NOT let the stockings roll down, creating a tourniquet around the back of your knee or ankle. If you need to, leave the excess at the bottom of the stocking.   The  best thing you can do to prevent blood clots is to walk around as much as possible, AT LEAST EVERY 1-2 HOURS.       CONSTIPATION PREVENTION:   Miralax or Senokot S/Marilu-Colace and Stool softeners EVERY DAY while on pain meds.  Use other more aggressive over the counter LAXATIVES as needed for constipation (Examples: Milk of Magnesia, Dulcolax tabs or suppository, Magnesium Citrate, Fleet's Enema...etc.)   Drink lots of water.  Increase Fiber in diet.  Increase walking distance each day  DO NOT GO MORE THAN 2 DAYS WITHOUT HAVING A BOWEL MOVEMENT!    ACTIVITY:   Weight bearing precautions as follows:  FULL weight bearing to operative leg with walker.   DO NOT TAKE YOURSELF OFF OF THE WALKER TOO SOON. ALLOW YOUR OUTPATIENT THERAPIST or SURGEON TO GUIDE YOU.   Range of motion as tolerated. Work on BENDING and STRAIGHTENING your knee. Change positions often throughout the day. DO NOT PUT ANYTHING BEHIND THE KNEE, KEEPING IT IN A BENT POSITION.   Walk around at least every 1-2 hours while awake.   No heavy lifting, pulling, pushing or straining. Take it easy!    SWELLING PREVENTION AND TREATMENT:  Elevate affected extremity way ABOVE THE LEVEL OF THE HEART to reduce swelling (15-30 minutes at a time, 3 times a day).  Ice the Knee, thigh and lower leg AS MUCH AS POSSIBLE  Compression stockings and ace wrap around the knee and thigh as much as possible. Ok to remove at night for comfort.     WOUND CARE:     Remove Prevena wound vac on 1/21/25 and discard the entire system. Once removed, apply an occlusive (coverlet) dressing to the wound and change every other day and as needed, until your follow-up appointment with Dr. Abad. For any issues related to the wound vac, call your surgeon. SPECIFIC INSTRUCTIONS AND TROUBLESHOOTING INFORMATION CAN ALSO BE FOUND IN YOUR DISCHARGE PACKET.  DON'T FORGET YOUR PREVENA WOUND VAC . YOU WILL NEED IT WHEN YOU GET HOME TO CHARGE THE WOUND VAC.    DO NOT TOUCH INCISION(s). DO NOT  WET THE INCISION(s). DO NOT apply any ointments, creams, lotions or antiseptics to the incision(s).   Ace wrap - apply your compression stocking to the lower leg and apply the ace wrap where the stocking stops for extra added compression to the knee and thigh.   May wet incision AFTER 2 weeks- (after you follow-up with your surgeon). Ok to shower before then if able to keep wound from getting wet (plastic barrier, saran wrap or cling wrap and tape).       URINARY RETENTION:  If you start having difficulty urinating, decrease the use of Pain pills and muscle relaxers and notify your primary care doctor.     PNEUMONIA PREVENTION:  Stay out of bed as much as possible and walk around every 1-2 hours.  Continue breathing exercises (Incentive Spirometry) every 1-2 hours while mobility is limited and while you are on pain pills.    FALL PREVENTION:  Wear sturdy shoes that fit well - Wearing shoes with high heels or slippery soles, or shoes that are too loose, can lead to falls. Walking around in bare feet, or only socks, can also increase your risk of falling.  Use walker as long as your surgeon and therapist recommend it  Use good lighting and  throw rugs, electrical cords, furniture and clutter (anything than can cause you to trip at home.   Non-slip rug in bathroom or shower      INFECTION PREVENTION:  NOZIN ANTISEPTIC NASAL  - twice a day for 2 weeks or until supply runs out, whichever comes first. Shake bottle well, saturate cotton swab with 4 drops of antiseptic solution. Swab right nostril rim 6-8 times clockwise and counterclockwise. Take swab out, apply 2 more drops then swab left nostril rim 6-8 times clockwise and counterclockwise.   Complete antibiotic regimen as prescribed.   Proper handwashing before and after dressing changes. Do not wet the wound. Wound care instructions as written above. NOTIFY MD OF EXCESSIVE WOUND DRAINAGE.  No alcohol, smoking or tobacco products  Pets should not be  allowed around the wound or the dressing.   Treat UTI and skin infections as soon as possible.  Pre-medicate with antibiotics prior to dental or surgical procedures.   If you are diabetic, MAINTAIN GOOD BLOOD SUGAR CONTROL (Below 150) DURING YOUR RECOVERY. If you see high numbers, notify your primary care doctor.     Call your SURGEON'S OFFICE ([phone]) if you experience the following signs and symptoms of infection:   Unusual redness, swelling, excessive, cloudy or foul smelling drainage at the incision site.   Persistent low grade temp OR a temp greater than 102 F, unrelieved by Tylenol  Pain at surgical site, unrelieved by pain meds    Warning signs of a blood clot in your leg: (CALL YOUR SURGEON)  New onset or increasing pain in calf, new onset tenderness or redness above or below the knee or increasing swelling of your calf, ankle, or foot.  Warning signs that a blood clot has traveled to your lungs: (REPORT TO THE ER/CALL 660)  Sudden or increase in Shortness of breath, sudden onset of chest pains, or  Localized chest pain with coughing.       IF ANY ISSUES ARISE AND YOU FEEL THE NEED TO CALL YOUR PRIMARY CARE DOCTOR, PLEASE LET YOUR SURGEON KNOW AS WELL.     For emergencies, please report to OUR ([facility] main [facility]) Emergency department and tell them to call YOUR SURGEON at [phone].     BEFORE MAKING ANY CHANGES TO THE MEDICAL CARE PLAN OR GOING TO THE EMERGENCY ROOM, PLEASE CONTACT THE SURGEON.      After discharge, all questions or concerns should be handled at your surgeon's office ([phone]). If it is a weekend or after hours, you will get the surgeon on call.     Amaris Alvarez RN, nurse navigator, available for questions or issues after discharge at [phone].    Negative